# Patient Record
Sex: FEMALE | Race: BLACK OR AFRICAN AMERICAN | NOT HISPANIC OR LATINO | Employment: FULL TIME | URBAN - METROPOLITAN AREA
[De-identification: names, ages, dates, MRNs, and addresses within clinical notes are randomized per-mention and may not be internally consistent; named-entity substitution may affect disease eponyms.]

---

## 2017-04-16 ENCOUNTER — HOSPITAL ENCOUNTER (EMERGENCY)
Facility: HOSPITAL | Age: 37
Discharge: HOME | End: 2017-04-16
Attending: EMERGENCY MEDICINE
Payer: MEDICAID

## 2017-04-16 VITALS
DIASTOLIC BLOOD PRESSURE: 84 MMHG | WEIGHT: 145 LBS | BODY MASS INDEX: 28.47 KG/M2 | HEART RATE: 64 BPM | OXYGEN SATURATION: 100 % | SYSTOLIC BLOOD PRESSURE: 132 MMHG | RESPIRATION RATE: 16 BRPM | HEIGHT: 60 IN | TEMPERATURE: 98 F

## 2017-04-16 DIAGNOSIS — H92.03 EAR PAIN, BILATERAL: Primary | ICD-10-CM

## 2017-04-16 DIAGNOSIS — J34.89 RHINORRHEA: ICD-10-CM

## 2017-04-16 DIAGNOSIS — J02.9 PHARYNGITIS, UNSPECIFIED ETIOLOGY: ICD-10-CM

## 2017-04-16 LAB
B-HCG UR QL: NEGATIVE
CTP QC/QA: YES

## 2017-04-16 PROCEDURE — 99283 EMERGENCY DEPT VISIT LOW MDM: CPT

## 2017-04-16 PROCEDURE — 81025 URINE PREGNANCY TEST: CPT | Performed by: EMERGENCY MEDICINE

## 2017-04-16 PROCEDURE — 25000003 PHARM REV CODE 250: Performed by: EMERGENCY MEDICINE

## 2017-04-16 PROCEDURE — 63600175 PHARM REV CODE 636 W HCPCS: Performed by: EMERGENCY MEDICINE

## 2017-04-16 RX ORDER — DIPHENHYDRAMINE HCL 25 MG
25 CAPSULE ORAL
Status: COMPLETED | OUTPATIENT
Start: 2017-04-16 | End: 2017-04-16

## 2017-04-16 RX ORDER — PREDNISONE 20 MG/1
40 TABLET ORAL DAILY
Qty: 10 TABLET | Refills: 0 | Status: SHIPPED | OUTPATIENT
Start: 2017-04-16 | End: 2017-04-21

## 2017-04-16 RX ORDER — PREDNISONE 20 MG/1
60 TABLET ORAL
Status: COMPLETED | OUTPATIENT
Start: 2017-04-16 | End: 2017-04-16

## 2017-04-16 RX ORDER — DIPHENHYDRAMINE HCL 25 MG
25 CAPSULE ORAL EVERY 6 HOURS PRN
Qty: 20 CAPSULE | Refills: 0 | COMMUNITY
Start: 2017-04-16 | End: 2018-05-09

## 2017-04-16 RX ORDER — AMLODIPINE BESYLATE 5 MG/1
5 TABLET ORAL DAILY
COMMUNITY
End: 2018-05-09

## 2017-04-16 RX ADMIN — PREDNISONE 60 MG: 20 TABLET ORAL at 05:04

## 2017-04-16 RX ADMIN — DIPHENHYDRAMINE HYDROCHLORIDE 25 MG: 25 CAPSULE ORAL at 05:04

## 2017-04-16 NOTE — ED AVS SNAPSHOT
[facility]  [place] [address]               Félix Sanchez   2017  4:00 PM   ED    Description:  Female : 1980   Department:  [facility]           Your Care was Coordinated By:     Provider Role From To    Luis Carlos Mckee MD Attending Provider 17 [phone] --      Reason for Visit     Sore Throat           Diagnoses this Visit        Comments    Ear pain, bilateral    -  Primary     Pharyngitis, unspecified etiology         Rhinorrhea           ED Disposition     None           To Do List           Follow-up Information     Follow up with Lillian Erickson MD In 3 days.    Specialty:  Family Medicine    Contact information:    [address] [address]         These Medications        Disp Refills Start End    predniSONE (DELTASONE) 20 MG tablet 10 tablet 0 2017    Take 2 tablets (40 mg total) by mouth once daily. - Oral      PURCHASE these Medications (No prescription required)        Start End    diphenhydrAMINE (BENADRYL) 25 mg capsule 2017     Sig - Route: Take 1 each (25 mg total) by mouth every 6 (six) hours as needed (Facial congestion and itching). - Oral    Class: OTC      [facility]     [facility] Care [facility] - 24/7 Assistance  Unless otherwise directed by your provider, please contact [facility], our nurse care line that is available for 24/7 assistance.     Registered nurses in the [facility] provide: appointment scheduling, clinical advisement, health education, and other advisory services.  Call: [phone] (toll free)               Medications           Message regarding Medications     Verify the changes and/or additions to your medication regime listed below are the same as discussed with your clinician today.  If any of these changes or additions are incorrect, please notify your healthcare provider.         START taking these NEW medications        Refills    predniSONE (DELTASONE) 20 MG tablet 0    Sig: Take 2 tablets (40 mg total) by mouth once daily.    Class: Print    Route: Oral    diphenhydrAMINE (BENADRYL) 25 mg capsule 0    Sig: Take 1 each (25 mg total) by mouth every 6 (six) hours as needed (Facial congestion and itching).    Class: OTC    Route: Oral      STOP taking these medications     ibuprofen (ADVIL,MOTRIN) 800 MG tablet Take 1 tablet (800 mg total) by mouth every 6 (six) hours as needed for Pain.    naproxen (NAPROSYN) 500 MG tablet Take 1 tablet (500 mg total) by mouth 2 (two) times daily with meals.           Verify that the below list of medications is an accurate representation of the medications you are currently taking.  If none reported, the list may be blank. If incorrect, please contact your healthcare provider. Carry this list with you in case of emergency.           Current Medications     amlodipine (NORVASC) 5 MG tablet Take 5 mg by mouth once daily.    diphenhydrAMINE (BENADRYL) 25 mg capsule Take 1 each (25 mg total) by mouth every 6 (six) hours as needed (Facial congestion and itching).    predniSONE (DELTASONE) 20 MG tablet Take 2 tablets (40 mg total) by mouth once daily.           Clinical Reference Information           Your Vitals Were     BP Pulse Temp Resp Height Weight    132/62 70 98.4 °F (36.9 °C) 18 5' (1.524 m) 65.8 kg (145 lb)    SpO2 BMI             99% 28.32 kg/m2         Allergies as of 4/16/2017     No Known Allergies      Immunizations Administered on Date of Encounter - 4/16/2017     None      ED Micro, Lab, POCT     Start Ordered       Status Ordering Provider    04/16/17 1505 04/16/17 1504  POCT urine pregnancy  Once      Final result       ED Imaging Orders     None        Discharge Instructions       With us Robitussin-DM for cough.  Tylenol for pain.  Please return immediately if you get worse or if new problems develop.  Please follow-up with her primary care  doctor this week.  Rest.    [facility] Sign-Up     Activating your [facility] account is as easy as 1-2-3!     1) Visit [email or web address], select Sign Up Now, enter this activation code and your date of birth, then select Next.  [email or web address]  Expires: 5/31/2017  4:18 PM      2) Create a username and password to use when you visit [facility] in the future and select a security question in case you lose your password and select Next.    3) Enter your e-mail address and click Sign Up!    Additional Information  If you have questions, please e-mail [email or web address] or call [phone] to talk to our [facility] staff. Remember, [facility] is NOT to be used for urgent needs. For medical emergencies, dial 911.          [facility] complies with applicable Federal civil rights laws and does not discriminate on the basis of race, color, national origin, age, disability, or sex.        Language Assistance Services     ATTENTION: Language assistance services are available, free of charge. Please call [phone].      ATENCIÓN: Si habla español, tiene a garay disposición servicios gratuitos de asistencia lingüística. Llame al [phone].     CHÚ Ý: N?u b?n nói Ti?ng Vi?t, có các d?ch v? h? tr? ngôn ng? mi?n phí dành cho b?n. G?i s? [phone].

## 2017-04-16 NOTE — ED TRIAGE NOTES
"Pt c/o "sore throat for some time since mid feburary. I cough up cold." productive cough with white sputum. Also c/o right ear pain x1 week after starting to wear ear piece for work  "

## 2017-04-16 NOTE — ED PROVIDER NOTES
Encounter Date: 4/16/2017    SCRIBE #1 NOTE: I, Marcela Crane, am scribing for, and in the presence of,  Luis Carlos Mckee MD. I have scribed the following portions of the note - Other sections scribed: ROS and HPI.       History     Chief Complaint   Patient presents with    Sore Throat     States she has a sore throat, ear pain, and can't hear out of her right ear     Review of patient's allergies indicates:  No Known Allergies  HPI Comments: CC: Sore Throat  HPI: This 36 y.o. female with a past medical history of Hypertension, presents to the ED complaining of congestion, L ear tingling, R ear pain, sore throat, and productive cough with mucus for last 2 months. Symptoms are acute, mild, and intermittent. Patient denies fever, chills, headache, trouble breathing, diarrhea or any other associated sx. No reported medical intervention for her sx.    The history is provided by the patient. No  was used.     Past Medical History:   Diagnosis Date    Hypertension      Past Surgical History:   Procedure Laterality Date    UTERINE FIBROID SURGERY       History reviewed. No pertinent family history.  Social History   Substance Use Topics    Smoking status: Never Smoker    Smokeless tobacco: None    Alcohol use Yes      Comment: occasionally     Review of Systems   Constitutional: Negative for chills and fever.   HENT: Positive for ear pain (bilateral) and sore throat.    Eyes: Negative for visual disturbance.   Respiratory: Positive for cough (with mucus). Negative for shortness of breath.    Gastrointestinal: Negative for abdominal pain and diarrhea.   Neurological: Negative for headaches.       Physical Exam   Initial Vitals   BP Pulse Resp Temp SpO2   04/16/17 1447 04/16/17 1447 04/16/17 1447 04/16/17 1447 04/16/17 1447   132/62 70 18 98.4 °F (36.9 °C) 99 %     Physical Exam  The patient was examined specifically for the following:   General:No significant distress, Good color, Warm and dry. Head  and neck:Scalp atraumatic, Neck supple. Neurological:Appropriate conversation, Gross motor deficits. Eyes:Conjugate gaze, Clear corneas. ENT: No epistaxis. Cardiac: Regular rate and rhythm, Grossly normal heart tones. Pulmonary: Wheezing, Rales. Gastrointestinal: Abdominal tenderness, Abdominal distention. Musculoskeletal: Extremity deformity, Apparent pain with range of motion of the joints. Skin: Rash.   The findings on examination were normal except for the following: Tympanic membranes are clear.  The ear canals are normal.  There is cerumen in both ear canals.  The pharynx is slightly red.  I no exudates.  There is no adenopathy.  There is no facial tenderness.  The patient has no lymphadenopathy.  She did not cough during the physical examination.  ED Course   Procedures  Labs Reviewed   POCT URINE PREGNANCY              Medical decision making: Given the above this patient has failed facial congestion bilateral ear pain and sore throat cough.  She's been sick for 2 months.  I find no convincing evidence of bacterial disease.  There is cerumen in both ear canals I doubt otitis media and otitis externa.  I will try to this patient with prednisone.  I will have her use Tylenol for pain.  I will advise Benadryl and Robitussin-DM.  I will have her return if she gets worse or if new problems develop.  I specifically doubt bacterial disease.  I doubt strep pharyngitis.  The patient is afebrile.            Scribe Attestation:   Scribe #1: I performed the above scribed service and the documentation accurately describes the services I performed. I attest to the accuracy of the note.    Attending Attestation:           Physician Attestation for Scribe:  Physician Attestation Statement for Scribe #1: I, Luis Carlos Mckee MD, reviewed documentation, as scribed by Marcela Crane in my presence, and it is both accurate and complete.                 ED Course     Clinical Impression:   The primary encounter diagnosis was Ear pain,  bilateral. Diagnoses of Pharyngitis, unspecified etiology and Rhinorrhea were also pertinent to this visit.          Luis Carlos Mckee MD  04/17/17 2128

## 2017-04-16 NOTE — DISCHARGE INSTRUCTIONS
With us Robitussin-DM for cough.  Tylenol for pain.  Please return immediately if you get worse or if new problems develop.  Please follow-up with her primary care doctor this week.  Rest.

## 2017-04-21 ENCOUNTER — HOSPITAL ENCOUNTER (EMERGENCY)
Facility: HOSPITAL | Age: 37
Discharge: HOME | End: 2017-04-21
Attending: EMERGENCY MEDICINE
Payer: MEDICAID

## 2017-04-21 VITALS
HEART RATE: 77 BPM | BODY MASS INDEX: 21.98 KG/M2 | WEIGHT: 145 LBS | OXYGEN SATURATION: 100 % | HEIGHT: 68 IN | DIASTOLIC BLOOD PRESSURE: 85 MMHG | RESPIRATION RATE: 20 BRPM | TEMPERATURE: 98 F | SYSTOLIC BLOOD PRESSURE: 130 MMHG

## 2017-04-21 DIAGNOSIS — H92.03 OTALGIA OF BOTH EARS: Primary | ICD-10-CM

## 2017-04-21 DIAGNOSIS — R09.81 NASAL CONGESTION: ICD-10-CM

## 2017-04-21 PROCEDURE — 99283 EMERGENCY DEPT VISIT LOW MDM: CPT

## 2017-04-21 RX ORDER — DIPHENHYDRAMINE HCL 25 MG
25 CAPSULE ORAL EVERY 6 HOURS PRN
Qty: 20 CAPSULE | Refills: 0 | Status: SHIPPED | OUTPATIENT
Start: 2017-04-21 | End: 2018-05-09

## 2017-04-21 RX ORDER — DIPHENHYDRAMINE HCL 25 MG
25 CAPSULE ORAL EVERY 6 HOURS PRN
Qty: 20 CAPSULE | Refills: 0 | COMMUNITY
Start: 2017-04-21 | End: 2017-04-21

## 2017-04-21 RX ORDER — FLUTICASONE PROPIONATE 50 MCG
1 SPRAY, SUSPENSION (ML) NASAL 2 TIMES DAILY PRN
Qty: 15 G | Refills: 0 | Status: SHIPPED | OUTPATIENT
Start: 2017-04-21 | End: 2019-03-27

## 2017-04-21 NOTE — DISCHARGE INSTRUCTIONS

## 2017-04-21 NOTE — ED AVS SNAPSHOT
[facility]  [place] [address]               Félix Sanchez   2017  2:21 PM   ED    Description:  Female : 1980   Department:  [facility]           Your Care was Coordinated By:     Provider Role From To    Ambrose Simeon MD Attending Provider 17 [phone] --    Theodore Landis PA-C Physician Assistant 17 [phone] --      Reason for Visit     Otalgia           Diagnoses this Visit        Comments    Otalgia of both ears    -  Primary     Nasal congestion           ED Disposition     None           To Do List           Follow-up Information     Follow up with [facility]. Schedule an appointment as soon as possible for a visit in 1 day.    Specialty:  Otolaryngology    Why:  For further evaluation    Contact information:    [address] [facility] [address]  [place] [address]  [phone]          Go to [facility].    Specialty:  Emergency Medicine    Why:  If symptoms worsen    Contact information:    [place] [address] [phone]       These Medications        Disp Refills Start End    fluticasone (FLONASE) 50 mcg/actuation nasal spray 15 g 0 2017     1 spray by Each Nare route 2 (two) times daily as needed for Rhinitis. - Each Nare    loratadine-pseudoephedrine  mg (CLARITIN-D 24 HOUR)  mg per 24 hr tablet 10 tablet 0 2017    Take 1 tablet by mouth once daily. Take during the day - Oral      PURCHASE these Medications (No prescription required)        Start End    diphenhydrAMINE (BENADRYL) 25 mg capsule 2017     Sig - Route: Take 1 each (25 mg total) by mouth every 6 (six) hours as needed for Itching or Allergies. Take at night - Oral    Class: OTC      [facility]     [facility] -  Assistance  Unless otherwise directed by your provider, please contact [facility], our nurse care  line that is available for  assistance.     Registered nurses in the [facility] provide: appointment scheduling, clinical advisement, health education, and other advisory services.  Call: [phone] (toll free)               Medications           Message regarding Medications     Verify the changes and/or additions to your medication regime listed below are the same as discussed with your clinician today.  If any of these changes or additions are incorrect, please notify your healthcare provider.        START taking these NEW medications        Refills    fluticasone (FLONASE) 50 mcg/actuation nasal spray 0    Si spray by Each Nare route 2 (two) times daily as needed for Rhinitis.    Class: Print    Route: Each Nare    loratadine-pseudoephedrine  mg (CLARITIN-D 24 HOUR)  mg per 24 hr tablet 0    Sig: Take 1 tablet by mouth once daily. Take during the day    Class: Print    Route: Oral    diphenhydrAMINE (BENADRYL) 25 mg capsule 0    Sig: Take 1 each (25 mg total) by mouth every 6 (six) hours as needed for Itching or Allergies. Take at night    Class: OTC    Route: Oral           Verify that the below list of medications is an accurate representation of the medications you are currently taking.  If none reported, the list may be blank. If incorrect, please contact your healthcare provider. Carry this list with you in case of emergency.           Current Medications     amlodipine (NORVASC) 5 MG tablet Take 5 mg by mouth once daily.    diphenhydrAMINE (BENADRYL) 25 mg capsule Take 1 each (25 mg total) by mouth every 6 (six) hours as needed (Facial congestion and itching).    diphenhydrAMINE (BENADRYL) 25 mg capsule Take 1 each (25 mg total) by mouth every 6 (six) hours as needed for Itching or Allergies. Take at night    fluticasone (FLONASE) 50 mcg/actuation nasal spray 1 spray by Each Nare route 2 (two) times daily as needed for Rhinitis.    loratadine-pseudoephedrine  mg  "(CLARITIN-D 24 HOUR)  mg per 24 hr tablet Take 1 tablet by mouth once daily. Take during the day    predniSONE (DELTASONE) 20 MG tablet Take 2 tablets (40 mg total) by mouth once daily.           Clinical Reference Information           Your Vitals Were     BP Pulse Temp Resp Height Weight    125/73 (BP Location: Left arm, Patient Position: Sitting) 78 98.5 °F (36.9 °C) (Oral) 16 5' 8" (1.727 m) 65.8 kg (145 lb)    Last Period SpO2 BMI          04/14/2017 100% 22.05 kg/m2        Allergies as of 4/21/2017     No Known Allergies      Immunizations Administered on Date of Encounter - 4/21/2017     None      ED Micro, Lab, POCT     None      ED Imaging Orders     None        Discharge Instructions         Earache, No Infection (Adult)  Earaches can happen without an infection. This occurs when air and fluid build up behind the eardrum causing a feeling of fullness and discomfort and reduced hearing. This is called otitis media with effusion (OME) or serous otitis media. It means there is fluid in the middle ear. It is not the same as acute otitis media, which is typically from infection.  OME can happen when you have a cold if congestion blocks the passage that drains the middle ear. This passage is called the eustachian tube. OME may also occur with nasal allergies or after a bacterial middle ear infection.    The pain or discomfort may come and go. You may hear clicking or popping sounds when you chew or swallow. You may feel that your balance is off. Or you may hear ringing in the ear.  It often takes from several weeks up to 3 months for the fluid to clear on its own. Oral pain relievers and ear drops help if there is pain. Decongestants and antihistamines sometimes help. Antibiotics don't help since there is no infection. Your doctor may prescribe a nasal spray to help reduce swelling in the nose and eustachian tube. This can allow the ear to drain.  If your OME doesn't improve after 3 months, surgery may be " used to drain the fluid and insert a small tube in the eardrum to allow continued drainage.  Because the middle ear fluid can become infected, it is important to watch for signs of an ear infection which may develop later. These signs include increased ear pain, fever, or drainage from the ear.  Home care  The following guidelines will help you care for yourself at home:  · You may use over-the-counter medicine as directed to control pain, unless another medicine was prescribed. If you have chronic liver or kidney disease or ever had a stomach ulcer or GI bleeding, talk with your doctor before using these medicines. Aspirin should never be used in anyone under 18 years of age who is ill with a fever. It may cause severe liver damage.  · You may use over-the-counter decongestants such as phenylephrine or pseudoephedrine. But they are not always helpful. Don't use nasal spray decongestants more than 3 days. Longer use can make congestion worse. Prescription nasal sprays from your doctor don't typically have those restrictions.  · Antihistamines may help if you are also having allergy symptoms.  · You may use medicines such as guaifenesin to thin mucus and promote drainage.  Follow-up care  Follow up with your healthcare provider or as advised if you are not feeling better after 3 days.  When to seek medical advice  Call your healthcare provider right away if any of the following occur:  · Your ear pain gets worse or does not start to improve   · Fever of 100.4°F (38°C) or higher, or as directed by your healthcare provider  · Fluid or blood draining from the ear  · Headache or sinus pain  · Stiff neck  · Unusual drowsiness or confusion  Date Last Reviewed: 10/1/2016  © 2093-9487 [employer]. [address] [place] [address]. All rights reserved. This information is not intended as a substitute for professional medical care. Always follow your healthcare professional's  instructions.          [facility] Sign-Up     Activating your [facility] account is as easy as 1-2-3!     1) Visit [email or web address], select Sign Up Now, enter this activation code and your date of birth, then select Next.  [email or web address]  Expires: 5/31/2017  4:18 PM      2) Create a username and password to use when you visit [facility] in the future and select a security question in case you lose your password and select Next.    3) Enter your e-mail address and click Sign Up!    Additional Information  If you have questions, please e-mail [email or web address] or call [phone] to talk to our [facility] staff. Remember, [facility] is NOT to be used for urgent needs. For medical emergencies, dial 911.          [facility] complies with applicable Federal civil rights laws and does not discriminate on the basis of race, color, national origin, age, disability, or sex.        Language Assistance Services     ATTENTION: Language assistance services are available, free of charge. Please call [phone].      ATENCIÓN: Si habla español, tiene a garay disposición servicios gratuitos de asistencia lingüística. Llame al [phone].     CHÚ Ý: N?u b?n nói Ti?ng Vi?t, có các d?ch v? h? tr? ngôn ng? mi?n phí dành cho b?n. G?i s? [phone].

## 2017-04-21 NOTE — ED TRIAGE NOTES
Pain that started in right ear and progressed to other ear seen her 5 days ago nose and throat problem in February  Has heard popping in ear

## 2017-04-21 NOTE — ED PROVIDER NOTES
"Encounter Date: 4/21/2017    SCRIBE #1 NOTE: I, Rocky Soto, am scribing for, and in the presence of,  Theodore Landis PA-C. I have scribed the following portions of the note - Other sections scribed: HPI and ROS.       History     Chief Complaint   Patient presents with    Otalgia     " I came here on easter because I was having ear, nose and throat pain. I am on my 5th day of steriods but I am still having pain."      Review of patient's allergies indicates:  No Known Allergies  HPI Comments: CC: Otalgia    HPI: This 36 y.o F with HTN presents to the ED c/o acute onset of constant and moderate (6/10) bilateral otalgia with described as "pressure and popping" with associated "fullness" of ears x2 weeks. She also reports rhinorrhea, intermittent frontal headache (none currently), and interment dizziness with activity described as "room spinning" (none currently). Pt was last seen in this ED 4/16/17 with the same symptoms; denies new or acute symptoms since 4/16. The pt also reports constant nasal congestion which began in February.The pt notes this pain is similar to her ear infections in the past. The pt denies fever, cough, sore throat, headache, chest pain, emesis and SOB. Pt attempted tx with Tylenol, Prednisone and Afrin with no relief; no medicine taken today.     The history is provided by the patient. No  was used.     Past Medical History:   Diagnosis Date    Hypertension      Past Surgical History:   Procedure Laterality Date    UTERINE FIBROID SURGERY       History reviewed. No pertinent family history.  Social History   Substance Use Topics    Smoking status: Never Smoker    Smokeless tobacco: None    Alcohol use Yes      Comment: occasionally     Review of Systems   Constitutional: Negative for fever.   HENT: Positive for ear pain (bilateral "pressure") and rhinorrhea. Negative for sore throat and tinnitus.         (+) "fullness" to ears   Respiratory: Negative for cough and " "shortness of breath.    Cardiovascular: Negative for chest pain.   Gastrointestinal: Negative for abdominal pain, nausea and vomiting.   Genitourinary: Negative for dysuria.   Musculoskeletal: Negative for back pain.   Skin: Negative for rash.   Neurological: Positive for dizziness ("room spinning"; resolved). Negative for weakness and headaches.   Hematological: Does not bruise/bleed easily.       Physical Exam   Initial Vitals   BP Pulse Resp Temp SpO2   04/21/17 1326 04/21/17 1326 04/21/17 1326 04/21/17 1326 04/21/17 1326   125/73 78 16 98.5 °F (36.9 °C) 100 %     Physical Exam    Nursing note and vitals reviewed.  Constitutional: She appears well-developed and well-nourished. She is not diaphoretic. No distress.   HENT:   Head: Normocephalic and atraumatic.   Right Ear: Tympanic membrane, external ear and ear canal normal. No drainage. No mastoid tenderness. Tympanic membrane is not perforated and not erythematous.   Left Ear: External ear and ear canal normal. No drainage. No mastoid tenderness. Tympanic membrane is not perforated and not erythematous.   Nose: Nose normal. No rhinorrhea. Right sinus exhibits no maxillary sinus tenderness and no frontal sinus tenderness. Left sinus exhibits no maxillary sinus tenderness and no frontal sinus tenderness.   Mouth/Throat: Uvula is midline and oropharynx is clear and moist. No oral lesions. No uvula swelling. No oropharyngeal exudate, posterior oropharyngeal edema, posterior oropharyngeal erythema or tonsillar abscesses.   Non-purulent effusion to L TM; able to discern several bony structures behind TM. Nasal congestion present. Speaking in clear and complete sentences. No TMJ TTP or pain with movement of jaw. No dental abscess or PTA.    Eyes: Conjunctivae and EOM are normal. Right eye exhibits no discharge. Left eye exhibits no discharge.   Neck: Normal range of motion. No tracheal deviation present. No rigidity. No JVD present.   Cardiovascular: Normal rate, " regular rhythm and normal heart sounds. Exam reveals no friction rub.    No murmur heard.  Pulmonary/Chest: Breath sounds normal. No stridor. No respiratory distress. She has no decreased breath sounds. She has no wheezes. She has no rhonchi. She has no rales. She exhibits no tenderness.   Musculoskeletal: Normal range of motion.   Neurological: She is alert and oriented to person, place, and time.   Skin: Skin is warm and dry. No rash and no abscess noted. No erythema. No pallor.         ED Course   Procedures  Labs Reviewed   POCT URINE PREGNANCY             Medical Decision Making:   History:   Old Medical Records: I decided to obtain old medical records.    This is an emergent evaluation of a 36 y.o. female with a PMHx of HTN presenting to the ED for persistent otalgia associated with nasal congeston. Denies fever, HA, emesis, dental pain, neck pain, and tinnitus. Vitals WNL, afebrile. Patient is non-toxic appearing and in no acute distress. Otalgia likely related to nasal congestion from resolving URI. No AOM, otitis externa, mastoiditis, or meningitis. No evidence of acute bacterial sinus infection; I do not feel patient requires antibiotics at this time. I doubt TMJ syndrome, trigeminal neuralgia, dental abscess, PTA, strep throat, and Joey's.     Patient recently completed steroid burst. Discharged home with flonase, claritin D, and benadryl. Instructed to follow up with ENT for reevaluation and management of symptoms. Patient is requesting a work note dating back 5 days and for tomorrow off. I will issue a work note for tomorrow off only.     I discussed with the patient the diagnosis, treatment plan, indications for return to the emergency department, and for expected follow-up. The patient verbalized an understanding. The patient is asked if there are any questions or concerns. We discuss the case, until all issues are addressed to the patients satisfaction. Patient understands and is agreeable to the  plan.     I discussed this patient with Dr. Simeon who is in agreement with my assessment and plan.           Scribe Attestation:   Scribe #1: I performed the above scribed service and the documentation accurately describes the services I performed. I attest to the accuracy of the note.    Attending Attestation:     Physician Attestation Statement for NP/PA:   I discussed this assessment and plan of this patient with the NP/PA, but I did not personally examine the patient. The face to face encounter was performed by the NP/PA.    Other NP/PA Attestation Additions:      Medical Decision Makin-year-old female presenting with bilateral ear pain and to pain.  I agree with plan.       Physician Attestation for Scribe:  Physician Attestation Statement for Scribe #1: I, Theodore Landis PA-C, reviewed documentation, as scribed by Rocky Soto in my presence, and it is both accurate and complete.                 ED Course     Clinical Impression:   The primary encounter diagnosis was Otalgia of both ears. A diagnosis of Nasal congestion was also pertinent to this visit.    Disposition:   Disposition: Discharged  Condition: Stable       Theodore Landis PA-C  17 [id number]       Ambrose Simeon MD  [id number]

## 2018-04-20 DIAGNOSIS — O10.919 CHRONIC HYPERTENSION AFFECTING PREGNANCY: ICD-10-CM

## 2018-04-20 DIAGNOSIS — Z36.89 ENCOUNTER FOR FETAL ANATOMIC SURVEY: Primary | ICD-10-CM

## 2018-04-20 DIAGNOSIS — O09.522 ELDERLY MULTIGRAVIDA IN SECOND TRIMESTER: ICD-10-CM

## 2018-04-24 ENCOUNTER — OFFICE VISIT (OUTPATIENT)
Dept: MATERNAL FETAL MEDICINE | Facility: CLINIC | Age: 38
End: 2018-04-24
Payer: COMMERCIAL

## 2018-04-24 VITALS
DIASTOLIC BLOOD PRESSURE: 82 MMHG | BODY MASS INDEX: 26.21 KG/M2 | WEIGHT: 172.38 LBS | SYSTOLIC BLOOD PRESSURE: 124 MMHG

## 2018-04-24 DIAGNOSIS — O28.0 ABNORMAL QUAD SCREEN: ICD-10-CM

## 2018-04-24 DIAGNOSIS — O10.919 CHRONIC HYPERTENSION AFFECTING PREGNANCY: ICD-10-CM

## 2018-04-24 DIAGNOSIS — O09.522 ELDERLY MULTIGRAVIDA IN SECOND TRIMESTER: ICD-10-CM

## 2018-04-24 DIAGNOSIS — Z36.89 ENCOUNTER FOR FETAL ANATOMIC SURVEY: ICD-10-CM

## 2018-04-24 DIAGNOSIS — O09.522 AMA (ADVANCED MATERNAL AGE) MULTIGRAVIDA 35+, SECOND TRIMESTER: ICD-10-CM

## 2018-04-24 PROCEDURE — 99215 OFFICE O/P EST HI 40 MIN: CPT | Mod: 25,S$GLB,, | Performed by: PEDIATRICS

## 2018-04-24 PROCEDURE — 99999 PR PBB SHADOW E&M-EST. PATIENT-LVL III: CPT | Mod: PBBFAC,,, | Performed by: PEDIATRICS

## 2018-04-24 PROCEDURE — 76811 OB US DETAILED SNGL FETUS: CPT | Mod: S$GLB,,, | Performed by: PEDIATRICS

## 2018-04-24 RX ORDER — NAPROXEN SODIUM 220 MG/1
81 TABLET, FILM COATED ORAL DAILY
Status: ON HOLD | COMMUNITY
End: 2018-08-14 | Stop reason: HOSPADM

## 2018-04-24 NOTE — LETTER
April 26, 2018      Luiz Ernandez MD  [address]  [address]  [place] [address]           [facility] - Maternal Fetal Med  [address] [address]  Phone: [phone]          Patient: Félix Sanchez   MR Number: [id number]   YOB: 1980   Date of Visit: 4/24/2018       Dear Dr. Luiz Ernandez:    Thank you for referring Félix Sanchez to me for evaluation. Attached you will find relevant portions of my assessment and plan of care.    If you have questions, please do not hesitate to call me. I look forward to following Félix Sanchez along with you.    Sincerely,    Nikki Thibodeaux MD    Enclosure  CC:  No Recipients    If you would like to receive this communication electronically, please contact [email or web address] or [phone] to request more information on [employer] Link access.    For providers and/or their staff who would like to refer a patient to [facility], please contact us through our one-stop-shop provider referral line, [facility], at [phone].    If you feel you have received this communication in error or would no longer like to receive these types of communications, please e-mail [email or web address]

## 2018-04-24 NOTE — PROGRESS NOTES
I saw Ms. Félix Davenport today for consultation regarding a screen positive quad followed by a screen negative cfDNA/NIPT.   She is a 37 you  female at Today I counseled the patient on the relationship between maternal age and genetic aneuploidy and positive/negative screening.  We discussed the risks and benefits of screening tests versus definitive genetic testing (amniocentesis). We discussed the limitations of ultrasound in the definitive diagnosis of Down syndrome and we discussed amniocentesis as providing definitive diagnosis.  I quoted the patient a 1 in 1200 procedure related risk of fetal loss with genetic amniocentesis. We discussed maternal serum screening with a positive increased DSr (hers was 1:38).   Her follow up non-invasive prenatal testing (NIPT) was screen negative; we discussed the accuracy/definitiveness including the sensitivity and specificity of the test.  While she knows that US is not diagnostic of aneuploidy or normalcy, a negative ultrasound reduces the risk of DS by 50+ %.    Her questions were answered, and after today's consultation she did not want to pursue amniocentesis.          In addition, I counseled the patient on maternal/fetal risks associated with CHTN during pregnancy including fetal growth restriction, miscarriage,  delivery, development of superimposed preeclampsia, and eclampsia. She was counseled on the recommendations for BP control, serial ultrasound for fetal growth assessment, and timing of delivery. I also recommend aspirin 81 mg daily which may decrease her risk of developing superimposed preeclampsia.              Recommendations:    1. Increased risk of hypertensive disease and preeclampsia,  birth, placenta previa, gestational diabetes, and placental abruption with diabetes require education.    2.  The patient's risk for adverse outcome should be assessed by age and  presence or absence of concomitant risk factors such as hypertension,  diabetes, obesity, low socioeconomic status, black race, and previous pregnancy complicated by growth restriction or  birth.    3. Genetic screening (done - see consult).    4. Anatomy scan (in process).    5. If between 35 and 39 inclusive, we recommend a follow up fetal ultrasound at 32-34 weeks for interval fetal growth and well being (biophysical profile).    6. Because of the increased risk of preeclampsia in patients with underlying chronic hypertension we recommend starting aspirin 81mg daily beginning in the late first trimester (taking).    7. Because of the increased risk of preeclampsia in patients with chronic hypertension we recommend obtaining a baseline CMP and 24 hour urine protein evaluation or a baseline urine protein to creatinine ratio.    8. Secondary to the higher incidence of intrauterine growth restriction (IUGR) in patients with chronic hypertension, we recommend periodic (every 4-6 weeks) fetal growth assessments.     9. Continued close observation of patient's blood pressures.  Care should be taken also to avoid hypotension in pregnancy as this can be associated with uteroplacental insufficiency.      10. Twice weekly fetal NSTs with once weekly MVP starting at 32 weeks until delivery.    11. In regards to timing of delivery we recommend following the guidelines from ACOG Committee Opinion Number 560 from 2013 as follows:    -Women on no medications: at 38 0/7 - 39 6/7 weeks EGA  -Women that are well controlled on medications: at 37 0/7 - 39 6/7 weeks EGA            Indication  ========    Fetal anatomy survey.    Method  ======    Transabdominal ultrasound examination, Voluson E10. View: Suboptimal view: limited by fetal position. Suboptimal view: limited by maternal  body habitus.    Pregnancy  =========    Bella pregnancy. Number of fetuses: 1.    Dating  ======    Ultrasound examination on: 2018  GA by U/S based upon: AC, BPD, Femur, HC  GA by U/S 21 w + 6  d  PARMJIT by U/S: 8/29/2018    General Evaluation  ==============    Cardiac activity: present.  bpm.  Fetal movements: visualized.  Presentation: breech.  Placenta:  Placental site: fundal.  Umbilical cord: Cord vessels: 3 vessel cord. Cord insertion: placental insertion: normal.  Amniotic fluid: Amount of AF: normal amount.    Fetal Biometry  ============    Fetal Biometry  BPD 49.9 mm 21w 1d Hadlock  OFD 71.0 mm 23w 5d Sagar  .3 mm 21w 6d Hadlock  .4 mm 21w 6d Hadlock  Femur 38.6 mm 22w 3d Hadlock  Cerebellum tr 23.8 mm 22w 6d Jaquez  CM 5.6 mm  Humerus 36.9 mm 22w 6d Sagar   g  Calculated by: Hadlock (BPD-HC-AC-FL)  EFW (lb) 1 lb  EFW (oz) 1 oz  Cephalic index 0.70  HC / AC 1.17  FL / BPD 0.77  FL / AC 0.23   bpm  Head / Face / Neck   5.8 mm    Fetal Anatomy  ============    Cranium: normal  Lateral ventricles: normal  Choroid plexus: normal  Midline falx: normal  Cavum septi pellucidi: normal  Cerebellum: normal  Cisterna magna: normal  Head shape: normal  Rt lateral ventricle: normal  Lt lateral ventricle: normal  Rt choroid plexus: normal  Lt choroid plexus: normal  Neck: suboptimal  Nuchal fold: not examined  Lips: suboptimal  Profile: normal  Nose: suboptimal  RVOT: suboptimal  LVOT: normal  4-chamber view: 4-chamber normal, septum suboptimal  Situs: normal  Aortic arch: suboptimal  Ductal arch: suboptimal  SVC: suboptimal  IVC: suboptimal  3-vessel view: suboptimal  3-vessel-trachea view: suboptimal  Cardiac axis: normal  Cardiac size: normal  Cardiac proportions: normal  Rt lung: normal  Lt lung: normal  Diaphragm: suboptimal  Cord insertion: normal  Stomach: normal  Kidneys: normal  Bladder: normal  Genitals: normal  Abdom. wall: normal  Rt kidney: normal  Lt kidney: normal  Liver: normal  Bowel: normal  Rt renal artery: normal  Lt renal artery: normal  Cervical spine: normal  Thoracic spine: normal  Lumbar spine: normal  Sacral spine: suboptimal  Rt arm: normal  Lt  arm: normal  Rt hand: suboptimal  Lt hand: visualized  Rt leg: normal  Lt leg: normal  Rt foot: visualized  Lt foot: visualized  Position of feet: normal    Maternal Structures  ===============    Uterus / Cervix  Cervical length 49.4 mm  Ovaries / Tubes / Adnexa  Rt ovary: Not visualized  Lt ovary: Not visualized            Consultation  ==========    See EPIC.            Impression  =========    A detailed fetal anatomic ultrasound examination was performed for the following high risk indication: AMA, screen positve quad, screen  negative cfDNA.    No fetal structural malformations are identified; however, fetal imaging is incomplete today. A follow-up study will be scheduled to complete the  fetal anatomic survey.    Fetal size today is consistent with established gestational age.  Cervical length is normal.  Placental location is normal without evidence of previa.        I spent 40+ minutes in direct consultation and care management with greater than 50% in face to face discussion.          Recommendation  ==============    Return in 4 weeks for completion of anatomy.  Consider fetal echo if abnormality or incomplete at next visit.    Thank you again for allowing us to participate in the care of your patients. If you have any questions concerning today's consultation, feel free  to contact me or one of my partners. We can be reached at [phone] during normal business hours. If you have a question after normal  business hours, please contact Labor and Delivery at [phone].

## 2018-04-26 PROBLEM — O28.0 ABNORMAL QUAD SCREEN: Status: ACTIVE | Noted: 2018-04-26

## 2018-04-26 PROBLEM — O10.919 CHRONIC HYPERTENSION AFFECTING PREGNANCY: Status: ACTIVE | Noted: 2018-04-26

## 2018-04-26 PROBLEM — O09.522 AMA (ADVANCED MATERNAL AGE) MULTIGRAVIDA 35+, SECOND TRIMESTER: Status: ACTIVE | Noted: 2018-04-26

## 2018-05-09 ENCOUNTER — OFFICE VISIT (OUTPATIENT)
Dept: MATERNAL FETAL MEDICINE | Facility: CLINIC | Age: 38
End: 2018-05-09
Payer: COMMERCIAL

## 2018-05-09 VITALS
BODY MASS INDEX: 26.75 KG/M2 | DIASTOLIC BLOOD PRESSURE: 74 MMHG | WEIGHT: 175.94 LBS | SYSTOLIC BLOOD PRESSURE: 122 MMHG

## 2018-05-09 DIAGNOSIS — O10.919 CHRONIC HYPERTENSION AFFECTING PREGNANCY: ICD-10-CM

## 2018-05-09 DIAGNOSIS — O09.522 ELDERLY MULTIGRAVIDA IN SECOND TRIMESTER: ICD-10-CM

## 2018-05-09 PROCEDURE — 99499 UNLISTED E&M SERVICE: CPT | Mod: S$GLB,,, | Performed by: PEDIATRICS

## 2018-05-09 PROCEDURE — 76816 OB US FOLLOW-UP PER FETUS: CPT | Mod: S$GLB,,, | Performed by: PEDIATRICS

## 2018-05-09 PROCEDURE — 99999 PR PBB SHADOW E&M-EST. PATIENT-LVL II: CPT | Mod: PBBFAC,,, | Performed by: PEDIATRICS

## 2018-05-09 NOTE — LETTER
May 10, 2018      Luiz Ernandez MD  [address]  [address]  [place] [address]           [facility] - Maternal Fetal Med  [address] [address]  Phone: [phone]          Patient: Félix Sanchez   MR Number: [id number]   YOB: 1980   Date of Visit: 5/9/2018       Dear Dr. Luiz Ernandez:    Thank you for referring Félix Sanchez to me for evaluation. Attached you will find relevant portions of my assessment and plan of care.    If you have questions, please do not hesitate to call me. I look forward to following Félix Sanchez along with you.    Sincerely,    Nikki Thibodeaux MD    Enclosure  CC:  No Recipients    If you would like to receive this communication electronically, please contact [email or web address] or [phone] to request more information on [employer] Link access.    For providers and/or their staff who would like to refer a patient to [facility], please contact us through our one-stop-shop provider referral line, [facility], at [phone].    If you feel you have received this communication in error or would no longer like to receive these types of communications, please e-mail [email or web address]

## 2018-05-10 NOTE — PROGRESS NOTES
Indication  ========    Follow-up evaluation of anatomy.    Pregnancy History  ==============    Maternal Lab Tests  Test: quad screen  Result:  + DSR    Result:  INFORMA NEG    Maternal Assessment  =================    Weight 80 kg  Weight (lb) 176 lb  BP syst 122 mmHg  BP diast 74 mmHg    Method  ======    Transabdominal ultrasound examination.    Pregnancy  =========    Bella pregnancy. Number of fetuses: 1.    Dating  ======    Cycle: regular cycle    General Evaluation  ==============    Cardiac activity: present.  bpm.  Fetal movements: visualized.  Presentation: cephalic.  Placenta: posterior.  Umbilical cord: 3 vessel cord.  Amniotic fluid: normal amount.    Fetal Biometry  ============    Fetal Biometry  Calculated by: Hadlock (BPD-HC-AC-FL)   bpm    Fetal Anatomy  ============    Lateral ventricles: normal  Lips: normal  Nose: normal  RVOT: normal  LVOT: normal  4-chamber view: 4-chamber normal, septum normal  Aortic arch: normal  Ductal arch: normal  SVC: suboptimal  IVC: suboptimal  3-vessel view: normal  3-vessel-trachea view: normal  Diaphragm: normal  Stomach: normal  Kidneys: normal  Bladder: normal  Sacral spine: visualized  Rt hand: visualized  Other: A full anatomic survey has been previously performed.          Impression  =========    The fetal anatomic survey was completed today, and no fetal structural abnormalities were noted.    The AFV is normal.          Recommendation  ==============    See prior consultation for recommendations.  Growth in 2 weeks.      Thank you again for allowing us to participate in the care of your patients. If you have any questions concerning today's consultation, feel free  to contact me or one of my partners. We can be reached at [phone] during normal business hours. If you have a question after normal  business hours, please contact Labor and Delivery at [phone].

## 2018-05-29 ENCOUNTER — HOSPITAL ENCOUNTER (EMERGENCY)
Facility: HOSPITAL | Age: 38
Discharge: HOME | End: 2018-05-29
Attending: EMERGENCY MEDICINE
Payer: COMMERCIAL

## 2018-05-29 VITALS
HEART RATE: 100 BPM | HEIGHT: 60 IN | SYSTOLIC BLOOD PRESSURE: 117 MMHG | TEMPERATURE: 99 F | BODY MASS INDEX: 34.55 KG/M2 | RESPIRATION RATE: 18 BRPM | OXYGEN SATURATION: 99 % | DIASTOLIC BLOOD PRESSURE: 70 MMHG | WEIGHT: 176 LBS

## 2018-05-29 DIAGNOSIS — M25.531 RIGHT WRIST PAIN: Primary | ICD-10-CM

## 2018-05-29 PROCEDURE — 99282 EMERGENCY DEPT VISIT SF MDM: CPT

## 2018-05-29 NOTE — ED PROVIDER NOTES
"Encounter Date: 5/29/2018  This is a SORT/MSE of a 37 y.o. female presenting to the ED with c/o right wrist pain x3 weeks. She is 27 weeks pregnant. She saw her OB/GYN today who referred her to the ED for her wrist pain. Care will be transferred to an alternate provider when patient is roomed for a full evaluation and final disposition. Patient is aware that he/she is awaiting a room in the emergency department, where another provider will review results, evaluate and treat as needed. JOY Ren DNP     History   No chief complaint on file.    38yo F, approx 27 weeks gravid with chief complaint atraumatic R wrist pain. She denies hx previous surgeries or issues. Denies radiculopathy or paresthesia. Admits to "clicking" sensation to R radial wrist. Denies swelling, denies redness or warmth, denies open wound or abrasion. Denies hx carpal tunnel or cubital tunnel. Pain constant, no alleviating or exacerbating factors, no radiation of pain.           Review of patient's allergies indicates:  No Known Allergies  Past Medical History:   Diagnosis Date    Advanced maternal age in multigravida, second trimester     Hypertension      Past Surgical History:   Procedure Laterality Date    UTERINE FIBROID SURGERY      WISDOM TOOTH EXTRACTION       No family history on file.  Social History   Substance Use Topics    Smoking status: Never Smoker    Smokeless tobacco: Never Used    Alcohol use No      Comment: occasionally     Review of Systems   Constitutional: Negative for fever.   HENT: Negative for sore throat.    Eyes: Negative.    Respiratory: Negative for shortness of breath.    Cardiovascular: Negative for chest pain.   Gastrointestinal: Negative for nausea.   Endocrine: Negative.    Genitourinary: Negative for dysuria.   Musculoskeletal: Positive for arthralgias. Negative for back pain, neck pain and neck stiffness.   Skin: Negative for rash.   Neurological: Negative for weakness and headaches.   Hematological: " Does not bruise/bleed easily.   Psychiatric/Behavioral: Negative.    All other systems reviewed and are negative.      Physical Exam     Initial Vitals   BP Pulse Resp Temp SpO2   -- -- -- -- --      MAP       --         Physical Exam    Nursing note and vitals reviewed.  Constitutional: She appears well-developed and well-nourished. She is not diaphoretic. No distress.   HENT:   Head: Normocephalic and atraumatic.   Eyes: Conjunctivae and EOM are normal. Pupils are equal, round, and reactive to light.   Neck: Normal range of motion. Neck supple. No tracheal deviation present.   Cardiovascular: Intact distal pulses.   Pulmonary/Chest: Breath sounds normal. No stridor. No respiratory distress. She has no wheezes.   Abdominal: Soft. Bowel sounds are normal. She exhibits no distension. There is no tenderness.   Musculoskeletal: Normal range of motion.   Mild ttp R distal radius, near styloid process laterally. No snuffbox ttp. Pain with wrist inversion. No palpable crepitus. No swelling or overlying skin changes. 2+radial pulse bilaterally.   Lymphadenopathy:     She has no cervical adenopathy.   Neurological: She is alert and oriented to person, place, and time.   Skin: Skin is warm and dry. Capillary refill takes less than 2 seconds.   Psychiatric: She has a normal mood and affect. Her behavior is normal. Judgment and thought content normal.         ED Course   Procedures  Labs Reviewed - No data to display          Medical Decision Making:   Differential Diagnosis:   Fx, contusion, sprain/strain, tendonitis  ED Management:  36yo F with atraumatic R wrist pain. No bony abnormality. No significant limitations of ROM. No swelling or skin changes. Low suspicion for infectious process. No radiculopathy or paresthesia. Doubt cubital or carpal tunnel. Good distal pulses and cap refill. Mild bony ttp radial styloid laterally. Without trauma, given overall well appearance and presentation, I do not feel need for imaging.  Will immobilize when able, RICE precautions, tylenol, have pt f/u with PCP. Return precautions given.  Other:   I have discussed this case with another health care provider.       <> Summary of the Discussion: Dr. Brooks                      Clinical Impression:   The encounter diagnosis was Right wrist pain.    Disposition:   Disposition: Discharged  Condition: Stable                        Robin Copeland PA-C  05/30/18 0632

## 2018-05-29 NOTE — ED TRIAGE NOTES
""I thought I was experiencing carpal tunnel 3 weeks ago. But in the last 3 days it's been a little swollen and when I turn it a certain way I hear a clicking noise" (right arm) Pt c/o right wrist pain, right thumb pain. Denies numbness/tingling. Denies taking meds PTA. Pt currently 27 weeks pregnant  "

## 2018-05-29 NOTE — DISCHARGE INSTRUCTIONS
Wear wrist splint when not at work. Tylenol as needed for pain. Continue current care. Return to this ED if any other problems occur. Follow-up with hand surgery if pain persists.

## 2018-06-06 ENCOUNTER — OFFICE VISIT (OUTPATIENT)
Dept: MATERNAL FETAL MEDICINE | Facility: CLINIC | Age: 38
End: 2018-06-06
Payer: COMMERCIAL

## 2018-06-06 ENCOUNTER — LAB VISIT (OUTPATIENT)
Dept: LAB | Facility: OTHER | Age: 38
End: 2018-06-06
Payer: COMMERCIAL

## 2018-06-06 VITALS — DIASTOLIC BLOOD PRESSURE: 76 MMHG | WEIGHT: 177.69 LBS | SYSTOLIC BLOOD PRESSURE: 118 MMHG | BODY MASS INDEX: 34.7 KG/M2

## 2018-06-06 DIAGNOSIS — O09.522 ELDERLY MULTIGRAVIDA IN SECOND TRIMESTER: ICD-10-CM

## 2018-06-06 DIAGNOSIS — Z36.89 ENCOUNTER FOR ULTRASOUND TO CHECK FETAL GROWTH: Primary | ICD-10-CM

## 2018-06-06 DIAGNOSIS — O10.919 CHRONIC HYPERTENSION AFFECTING PREGNANCY: ICD-10-CM

## 2018-06-06 DIAGNOSIS — O99.810 ABNORMAL MATERNAL GLUCOSE TOLERANCE, ANTEPARTUM: Primary | ICD-10-CM

## 2018-06-06 LAB
GLUCOSE SERPL-MCNC: 131 MG/DL
GLUCOSE SERPL-MCNC: 165 MG/DL
GLUCOSE SERPL-MCNC: 178 MG/DL
GLUCOSE SERPL-MCNC: 72 MG/DL

## 2018-06-06 PROCEDURE — 99999 PR PBB SHADOW E&M-EST. PATIENT-LVL II: CPT | Mod: PBBFAC,,, | Performed by: PEDIATRICS

## 2018-06-06 PROCEDURE — 99499 UNLISTED E&M SERVICE: CPT | Mod: S$GLB,,, | Performed by: PEDIATRICS

## 2018-06-06 PROCEDURE — 36415 COLL VENOUS BLD VENIPUNCTURE: CPT

## 2018-06-06 PROCEDURE — 82951 GLUCOSE TOLERANCE TEST (GTT): CPT

## 2018-06-06 PROCEDURE — 76816 OB US FOLLOW-UP PER FETUS: CPT | Mod: S$GLB,,, | Performed by: PEDIATRICS

## 2018-06-06 PROCEDURE — 82952 GTT-ADDED SAMPLES: CPT

## 2018-06-06 NOTE — PROGRESS NOTES
"Indication  ========    F/U Consultation. Follow-up evaluation for fetal growth Follow-up evaluation of anatomy.    History  ======    Risk Factors  Details: Advanced Maternal Age  Details: Chronic Hypertension    Pregnancy History  ==============    Maternal Lab Tests  Test: quad screen  Result:  + DSR    Result:  INFORMA NEG    Maternal Assessment  =================    Weight 81 kg  Weight (lb) 179 lb  BP syst 118 mmHg  BP diast 76 mmHg    Method  ======    Transabdominal ultrasound examination, [place] E10. View: Suboptimal view: limited by fetal position.    Pregnancy  =========    Bella pregnancy. Number of fetuses: 1.    Dating  ======    Cycle: regular cycle  GA by "stated dating" 28 w + 4 d  PARMJIT by "stated dating": 8/25/2018  Ultrasound examination on: 6/6/2018  GA by U/S based upon: AC, BPD, Femur, HC  GA by U/S 28 w + 6 d  PARMJIT by U/S: 8/23/2018  Assigned: Dating performed on 06/6/2018, based on the external assessment  Assigned GA 28 w + 4 d  Assigned PARMJIT: 8/25/2018    General Evaluation  ==============    Cardiac activity: present.  bpm.  Fetal movements: visualized.  Presentation: breech.  Placenta:  Placental site: posterior.  Umbilical cord: Cord vessels: 3 vessel cord.  Amniotic fluid: Amount of AF: normal amount. MVP 5.3 cm.    Fetal Biometry  ============    Fetal Biometry  BPD 67.5 mm 27w 1d Hadlock  OFD 95.2 mm 30w 5d Sagar  .5 mm 28w 3d Hadlock  .8 mm 30w 4d Hadlock  Femur 55.0 mm 29w 0d Hadlock  EFW 1,424 g 57% Dima  Calculated by: Hadlock (BPD-HC-AC-FL)  EFW (lb) 3 lb  EFW (oz) 2 oz  Cephalic index 0.71  HC / AC 0.99  FL / BPD 0.81  FL / AC 0.21  MVP 5.3 cm   bpm    Fetal Anatomy  ============    Cranium: normal  4-chamber view: documented previously  SVC: suboptimal  IVC: suboptimal  Stomach: normal  Kidneys: normal  Bladder: normal          Impression  =========    Fetal size is AGA with the EFW at the 57th percentile.    Normal repeat limited fetal " anatomic survey.  AFV is normal.    3 hour GTT in process.  Growth for AMA.            Recommendation  ==============    1. Return in 4 weeks for growth.  2. ?consult if 3 hour abnormal.    Thank you again for allowing us to participate in the care of your patients. If you have any questions concerning today's consultation, feel free  to contact me or one of my partners. We can be reached at [phone] during normal business hours. If you have a question after normal  business hours, please contact Labor and Delivery at [phone].

## 2018-06-06 NOTE — LETTER
Dx: Closed displaced fracture of left tibial spine , Acute meniscal tear of left knee, subsequent encounter s/p ACL                              Next MD visit: July 26,2017  Fall Risk: standard         Precautions: n/a           Medications: Yes/no: no  Cruz June 6, 2018      Luiz Ernandez MD  [address]  [address]  [place] [address]           [facility] - Maternal Fetal Med  [address] [address]  Phone: [phone]          Patient: Félix Sanchez   MR Number: [id number]   YOB: 1980   Date of Visit: 6/6/2018       Dear Dr. Luiz Ernandez:    Thank you for referring Félix Sanchez to me for evaluation. Attached you will find relevant portions of my assessment and plan of care.    If you have questions, please do not hesitate to call me. I look forward to following Félix Sanchez along with you.    Sincerely,    Nikki Thibodeaux MD    Enclosure  CC:  No Recipients    If you would like to receive this communication electronically, please contact [email or web address] or [phone] to request more information on [employer] access.    For providers and/or their staff who would like to refer a patient to [facility], please contact us through our one-stop-shop provider referral line, [facility], at [phone].    If you feel you have received this communication in error or would no longer like to receive these types of communications, please e-mail [email or web address]          2x10 Step up / lateral step up on 6 inch step x 10 x 2  bosu : lunges x 20 Sideways steps with GTB X 4 laps   Monster walk with GTB  X 4 laps  Quick steps on platform 1 min then straddle steps 1 minx2    QS in supine t hen long sitting  Heel slides 2x10 Sh

## 2018-07-09 ENCOUNTER — OFFICE VISIT (OUTPATIENT)
Dept: MATERNAL FETAL MEDICINE | Facility: CLINIC | Age: 38
End: 2018-07-09
Payer: COMMERCIAL

## 2018-07-09 DIAGNOSIS — O10.919 CHRONIC HYPERTENSION AFFECTING PREGNANCY: ICD-10-CM

## 2018-07-09 DIAGNOSIS — Z36.89 ENCOUNTER FOR ULTRASOUND TO CHECK FETAL GROWTH: ICD-10-CM

## 2018-07-09 PROCEDURE — 76819 FETAL BIOPHYS PROFIL W/O NST: CPT | Mod: S$GLB,,, | Performed by: OBSTETRICS & GYNECOLOGY

## 2018-07-09 PROCEDURE — 76816 OB US FOLLOW-UP PER FETUS: CPT | Mod: S$GLB,,, | Performed by: OBSTETRICS & GYNECOLOGY

## 2018-07-09 PROCEDURE — 99499 UNLISTED E&M SERVICE: CPT | Mod: S$GLB,,, | Performed by: OBSTETRICS & GYNECOLOGY

## 2018-08-06 ENCOUNTER — OFFICE VISIT (OUTPATIENT)
Dept: MATERNAL FETAL MEDICINE | Facility: CLINIC | Age: 38
End: 2018-08-06
Payer: COMMERCIAL

## 2018-08-06 DIAGNOSIS — Z36.89 ENCOUNTER FOR ULTRASOUND TO CHECK FETAL GROWTH: ICD-10-CM

## 2018-08-06 PROCEDURE — 99999 PR PBB SHADOW E&M-EST. PATIENT-LVL I: CPT | Mod: PBBFAC,,,

## 2018-08-06 PROCEDURE — 76816 OB US FOLLOW-UP PER FETUS: CPT | Mod: S$GLB,,, | Performed by: PEDIATRICS

## 2018-08-06 PROCEDURE — 76819 FETAL BIOPHYS PROFIL W/O NST: CPT | Mod: S$GLB,,, | Performed by: PEDIATRICS

## 2018-08-06 PROCEDURE — 99499 UNLISTED E&M SERVICE: CPT | Mod: S$GLB,,, | Performed by: PEDIATRICS

## 2018-08-07 NOTE — PROGRESS NOTES
"Indication  ========    Follow-up evaluation for fetal growth.    History  ======    Risk Factors  Details: Advanced Maternal Age  Details: Chronic Hypertension    Pregnancy History  ==============    Maternal Lab Tests  Test: quad screen  Result:  + DSR    Result:  INFORMA NEG    Method  ======    Transabdominal ultrasound examination. View: Sufficient.    Pregnancy  =========    Bella pregnancy. Number of fetuses: 1.    Dating  ======    Cycle: regular cycle  GA by "stated dating" 37 w + 2 d  PARMJIT by "stated dating": 8/25/2018  Ultrasound examination on: 8/6/2018  GA by U/S based upon: AC, BPD, Femur, HC  GA by U/S 36 w + 6 d  PARMJIT by U/S: 8/28/2018  Assigned: Dating performed on 06/6/2018, based on the external assessment  Assigned GA 37 w + 2 d  Assigned PARMJIT: 8/25/2018    General Evaluation  ==============    Cardiac activity: present.  bpm.  Fetal movements: visualized.  Presentation: cephalic.  Placenta:  Placental site: fundal, posterior.  Umbilical cord: Cord vessels: 3 vessel cord.  Amniotic fluid: Amount of AF: normal amount. MVP 5.8 cm.    Biophysical Profile  ==============    2: Fetal breathing movements  2: Gross body movements  2: Fetal tone  2: Amniotic fluid volume  8/8: Biophysical profile score  Interpretation: normal    Fetal Biometry  ============    Fetal Biometry  BPD 87.7 mm 35w 3d Hadlock  .6 mm  .5 mm 37w 4d Hadlock  .4 mm 39w 0d Hadlock  Femur 69.2 mm 35w 4d Hadlock  EFW 3,270 g 59% Dima  Calculated by: Hadlock (BPD-HC-AC-FL)  EFW (lb) 7 lb  EFW (oz) 3 oz  Cephalic index 0.74  HC / AC 0.94  FL / BPD 0.79  FL / AC 0.20  MVP 5.8 cm   bpm  Head / Face / Neck   4.3 mm    Fetal Anatomy  ============    Cranium: normal  4-chamber view: normal  Stomach: normal  Kidneys: normal  Bladder: normal            Impression  =========    Fetal size is AGA with the EFW at the 59th percentile.    Normal repeat limited fetal anatomic survey.  AFV is " normal.              Recommendation  ==============    1. Continue APT as ordered.  2. Delivery per ACOG recommendations.    Thank you again for allowing us to participate in the care of your patients. If you have any questions concerning today's consultation, feel free  to contact me or one of my partners. We can be reached at [phone] during normal business hours. If you have a question after normal  business hours, please contact Labor and Delivery at [phone].

## 2018-08-11 PROBLEM — O47.9 IRREGULAR CONTRACTIONS: Status: ACTIVE | Noted: 2018-08-11

## 2018-08-12 ENCOUNTER — ANESTHESIA (OUTPATIENT)
Dept: OBSTETRICS AND GYNECOLOGY | Facility: HOSPITAL | Age: 38
End: 2018-08-12
Payer: COMMERCIAL

## 2018-08-12 ENCOUNTER — HOSPITAL ENCOUNTER (INPATIENT)
Facility: HOSPITAL | Age: 38
LOS: 3 days | Discharge: HOME | End: 2018-08-15
Attending: OBSTETRICS & GYNECOLOGY | Admitting: OBSTETRICS & GYNECOLOGY
Payer: COMMERCIAL

## 2018-08-12 ENCOUNTER — ANESTHESIA EVENT (OUTPATIENT)
Dept: OBSTETRICS AND GYNECOLOGY | Facility: HOSPITAL | Age: 38
End: 2018-08-12
Payer: COMMERCIAL

## 2018-08-12 DIAGNOSIS — Z34.90 TERM PREGNANCY: ICD-10-CM

## 2018-08-12 DIAGNOSIS — O47.9 IRREGULAR CONTRACTIONS: ICD-10-CM

## 2018-08-12 LAB
ABO + RH BLD: NORMAL
BASOPHILS # BLD AUTO: 0.02 K/UL
BASOPHILS NFR BLD: 0.2 %
BLD GP AB SCN CELLS X3 SERPL QL: NORMAL
DIFFERENTIAL METHOD: ABNORMAL
EOSINOPHIL # BLD AUTO: 0.1 K/UL
EOSINOPHIL NFR BLD: 0.4 %
ERYTHROCYTE [DISTWIDTH] IN BLOOD BY AUTOMATED COUNT: 16.6 %
HCT VFR BLD AUTO: 38.5 %
HGB BLD-MCNC: 12 G/DL
LYMPHOCYTES # BLD AUTO: 1.8 K/UL
LYMPHOCYTES NFR BLD: 14.9 %
MCH RBC QN AUTO: 25.2 PG
MCHC RBC AUTO-ENTMCNC: 31.2 G/DL
MCV RBC AUTO: 81 FL
MONOCYTES # BLD AUTO: 0.9 K/UL
MONOCYTES NFR BLD: 7.6 %
NEUTROPHILS # BLD AUTO: 9 K/UL
NEUTROPHILS NFR BLD: 76.4 %
PLATELET # BLD AUTO: 264 K/UL
PMV BLD AUTO: 9 FL
RBC # BLD AUTO: 4.77 M/UL
WBC # BLD AUTO: 11.82 K/UL

## 2018-08-12 PROCEDURE — 85025 COMPLETE CBC W/AUTO DIFF WBC: CPT

## 2018-08-12 PROCEDURE — 25000003 PHARM REV CODE 250: Performed by: ANESTHESIOLOGY

## 2018-08-12 PROCEDURE — 62326 NJX INTERLAMINAR LMBR/SAC: CPT | Performed by: ANESTHESIOLOGY

## 2018-08-12 PROCEDURE — 59409 OBSTETRICAL CARE: CPT | Mod: AA,,, | Performed by: ANESTHESIOLOGY

## 2018-08-12 PROCEDURE — 99211 OFF/OP EST MAY X REQ PHY/QHP: CPT

## 2018-08-12 PROCEDURE — S0020 INJECTION, BUPIVICAINE HYDRO: HCPCS | Performed by: ANESTHESIOLOGY

## 2018-08-12 PROCEDURE — 11000001 HC ACUTE MED/SURG PRIVATE ROOM

## 2018-08-12 PROCEDURE — 51702 INSERT TEMP BLADDER CATH: CPT

## 2018-08-12 PROCEDURE — 36415 COLL VENOUS BLD VENIPUNCTURE: CPT

## 2018-08-12 PROCEDURE — 27800516 HC TRAY, EPIDURAL COMBO: Performed by: ANESTHESIOLOGY

## 2018-08-12 PROCEDURE — 72100002 HC LABOR CARE, 1ST 8 HOURS

## 2018-08-12 PROCEDURE — 86592 SYPHILIS TEST NON-TREP QUAL: CPT

## 2018-08-12 PROCEDURE — 63600175 PHARM REV CODE 636 W HCPCS: Performed by: OBSTETRICS & GYNECOLOGY

## 2018-08-12 PROCEDURE — 27200710 HC EPIDURAL INFUSION PUMP SET: Performed by: ANESTHESIOLOGY

## 2018-08-12 PROCEDURE — 25000003 PHARM REV CODE 250: Performed by: OBSTETRICS & GYNECOLOGY

## 2018-08-12 PROCEDURE — 86850 RBC ANTIBODY SCREEN: CPT

## 2018-08-12 RX ORDER — SODIUM CHLORIDE, SODIUM LACTATE, POTASSIUM CHLORIDE, CALCIUM CHLORIDE 600; 310; 30; 20 MG/100ML; MG/100ML; MG/100ML; MG/100ML
INJECTION, SOLUTION INTRAVENOUS CONTINUOUS
Status: DISCONTINUED | OUTPATIENT
Start: 2018-08-12 | End: 2018-08-13

## 2018-08-12 RX ORDER — MEPERIDINE HYDROCHLORIDE 50 MG/ML
50 INJECTION INTRAMUSCULAR; INTRAVENOUS; SUBCUTANEOUS
Status: DISCONTINUED | OUTPATIENT
Start: 2018-08-12 | End: 2018-08-13

## 2018-08-12 RX ORDER — BUPIVACAINE HYDROCHLORIDE 2.5 MG/ML
INJECTION, SOLUTION EPIDURAL; INFILTRATION; INTRACAUDAL
Status: DISCONTINUED | OUTPATIENT
Start: 2018-08-12 | End: 2018-08-13

## 2018-08-12 RX ORDER — BUPIVACAINE HYDROCHLORIDE 2.5 MG/ML
INJECTION, SOLUTION INFILTRATION; PERINEURAL
Status: COMPLETED | OUTPATIENT
Start: 2018-08-12 | End: 2018-08-12

## 2018-08-12 RX ORDER — FENTANYL/BUPIVACAINE/NS/PF 2MCG/ML-.1
PLASTIC BAG, INJECTION (ML) INJECTION CONTINUOUS PRN
Status: DISCONTINUED | OUTPATIENT
Start: 2018-08-12 | End: 2018-08-13

## 2018-08-12 RX ORDER — MISOPROSTOL 200 UG/1
600 TABLET ORAL
Status: DISCONTINUED | OUTPATIENT
Start: 2018-08-12 | End: 2018-08-13

## 2018-08-12 RX ORDER — OXYTOCIN/RINGER'S LACTATE 20/1000 ML
41.65 PLASTIC BAG, INJECTION (ML) INTRAVENOUS CONTINUOUS
Status: ACTIVE | OUTPATIENT
Start: 2018-08-12 | End: 2018-08-13

## 2018-08-12 RX ADMIN — SODIUM CHLORIDE, SODIUM LACTATE, POTASSIUM CHLORIDE, AND CALCIUM CHLORIDE: .6; .31; .03; .02 INJECTION, SOLUTION INTRAVENOUS at 11:08

## 2018-08-12 RX ADMIN — AMPICILLIN SODIUM 1 G: 1 INJECTION, POWDER, FOR SOLUTION INTRAMUSCULAR; INTRAVENOUS at 07:08

## 2018-08-12 RX ADMIN — BUPIVACAINE HYDROCHLORIDE 0.8 ML: 2.5 INJECTION, SOLUTION INFILTRATION; PERINEURAL at 06:08

## 2018-08-12 RX ADMIN — PROMETHAZINE HYDROCHLORIDE 25 MG: 25 INJECTION INTRAMUSCULAR; INTRAVENOUS at 05:08

## 2018-08-12 RX ADMIN — Medication 10 ML/HR: at 06:08

## 2018-08-12 RX ADMIN — SODIUM CHLORIDE, SODIUM LACTATE, POTASSIUM CHLORIDE, AND CALCIUM CHLORIDE: .6; .31; .03; .02 INJECTION, SOLUTION INTRAVENOUS at 03:08

## 2018-08-12 RX ADMIN — BUPIVACAINE HYDROCHLORIDE 0.8 ML: 2.5 INJECTION, SOLUTION EPIDURAL; INFILTRATION; INTRACAUDAL; PERINEURAL at 06:08

## 2018-08-12 RX ADMIN — SODIUM CHLORIDE, SODIUM LACTATE, POTASSIUM CHLORIDE, AND CALCIUM CHLORIDE: .6; .31; .03; .02 INJECTION, SOLUTION INTRAVENOUS at 06:08

## 2018-08-12 RX ADMIN — AMPICILLIN SODIUM 2 G: 2 INJECTION, POWDER, FOR SOLUTION INTRAMUSCULAR; INTRAVENOUS at 03:08

## 2018-08-12 RX ADMIN — AMPICILLIN SODIUM 1 G: 1 INJECTION, POWDER, FOR SOLUTION INTRAMUSCULAR; INTRAVENOUS at 11:08

## 2018-08-12 RX ADMIN — MEPERIDINE HYDROCHLORIDE 50 MG: 50 INJECTION INTRAMUSCULAR; INTRAVENOUS; SUBCUTANEOUS at 05:08

## 2018-08-12 NOTE — NURSING
sve as charted, moderate amount of dark brown discharge to exam glove.  Slight change to sve from yesterday.  Contractions still palpate mild-moderate, soft between. Active fetal movement.  Reviewed poc for monitoring, want to ambulate on unit after 30 min of fetal monitoring, will recheck cervix in 1 hour.  Verbal recall. Large cup of water provided.     1200  Reactive NST.  Wants to ambulate on unit before next sve.  Monitors removed.  Instructed to inform me when back in room.    1315  sve done with no change and brown discharge to exam glove.  States contractions feel the same after walking.  Explained early labor with verbal recall. Informed Dr Olea of complaints, contractions q 7-13 min, sve with no change x 2 today and slight change from yesterday, reactive nst.  Orders rec'd to discharge home with labor precautions,rb&v.    1335 contractions q 4-6 minutes now. Will monitor and recheck cervix at 1415.  Pt in agreement.    1440  Called DR Olea, informed of change to sve and will call Dr Ernandez for admit orders.

## 2018-08-12 NOTE — DISCHARGE INSTRUCTIONS
Breastfeeding discharge instructions given with First Alert form and reviewed.  Also discussed:   AAP recommendation of exclusive breastfeeding for the first 6 months of life and continued breastfeeding with the introduction of supplemental foods beyond the first year of life.  Instructed on the recommendation to delay all bottle and pacifier use until after 4 weeks of age and breastfeeding is well established.  Discussed the benefits of exclusive breastfeeding for both mother and baby.  Discussed the risks of supplementation/pacifier use on the exclusivity of breastfeeding in the first 6 months. Feed the baby at the earliest sign of hunger or comfort  o Hands to mouth, sucking motions  o Rooting or searching for something to suck on  o Dont wait for crying - it is a not a late sign of hunger; it is a sign of distress     The feedings may be 8-12 times per 24hrs and will not follow a schedule   Alternate the breast you start the feeding with, or start with the breast that feels the fullest   Switch breasts when the baby takes himself off the breast or falls asleep   Keep offering breasts until the baby looks full, no longer gives hunger signs, and stays asleep when placed on his back in the crib   If the baby is sleepy and wont wake for a feeding, put the baby skin-to-skin dressed in a diaper against the mothers bare chest   Sleep near your baby   The baby should be positioned and latched on to the breast correctly  o Chest-to-chest, chin in the breast  o Babys lips are flipped outward  o Babys mouth is stretched open wide like a shout  o Babys sucking should feel like tugging to the mother  - The baby should be drinking at the breast:  o You should hear swallowing or gulping throughout the feeding  o You should see milk on the babys lips when he comes off the breast  o Your breasts should be softer when the baby is finished feeding  o The baby should look relaxed at the end of feedings  o After  the 4th day and your milk is in:  o The babys poop should turn bright yellow and be loose, watery, and seedy  o The baby should have at least 3-4 poops the size of the palm of your hand per day  o The baby should have at least 6-8 wet diapers per day  o The urine should be light yellow in color  You should drink when you are thirsty and eat a healthy diet when you are    hungry.     Take naps to get the rest you need.   Take medications and/or drink alcohol only with permission of your obstetrician    or the babys pediatrician.  You can also call the [facility],   ([phone]), Monday-Friday, 8am-5pm Central time, to get the most   up-to-date evidence-based information on the use of medications during   pregnancy and breastfeeding.      The baby should be examined by a pediatrician at 3-5 days of age; unless ordered sooner by the pediatrician.  Once your milk comes in, the baby should be back to birth weight no later than 10-14 days of age.    After a Vaginal Birth    General Discharge Instructions    · May follow a regular diet, unless otherwise discussed with physician.    · Take showers, not baths unless otherwise discussed with physician.    · Activity as tolerated.    · No lifting or heavy exercise for 6 weeks, no driving for 2 weeks, no sexual intercourse, douching or tampons for 6 weeks    · May return to work/school as discussed with physician  · Take medications as directed    · Discuss birth control with physician    · Breast care support bra worn at all times    · Lactation consultant referral number ( [phone] or [phone])    Call Your Healthcare Provider Right Away If You Have:  · A temperature of 100.4°F or higher.  · If your blood pressure is over 155/105.  · You have difficulty catching your breath or trouble breathing.  · Heavy vaginal bleeding, clots, or vaginal discharge with foul odor. (heavier than menses)  · Persistent nausea or vomiting.  · You gain more than 3 pounds in 3  days.  · Severe headaches not relieved by Tylenol (acetaminophen) or Motrin (ibuprofen)  · Blurry or double vision, see spots or flashing lights.  · Dizziness or fainting.  · New onset swelling or worsening of existing swelling.  · Burning or pain when you urinate.  · No bowel movement for 5 days.  · Redness, warmth, swelling, or pain in the lower leg.  · Redness, discharge, or pain worse than you had in the hospital.  · Burning, pain, red streaks, or lumpy areas in your breasts.  · Cracks, blisters, or blood on your nipples.  · Feelings of extreme sadness or anxiety, or a feeling that you dont want to be with your baby.  · If you have any new or unusual symptoms or have questions or concerns    Some of these symptoms can occur up to 4 to 6 weeks after delivery. This can be a sign of pre-eclampsia, which is a serious disease that can cause stroke, seizures, organ damage, or death. Do not wait to call your doctor or seek medical attention.            If You Had Stitches  You may have received stitches in the skin near your vagina. The stitches might have closed an episiotomy (an incision that enlarges the opening of the vagina). Or you may have needed stitches to repair torn skin. Either way, your stitches should dissolve within weeks. Until then, you can help reduce discomfort, aid healing, and reduce your risk of infection by keeping the stitches clean. These tips can help:    · Gently wipe from front to back after you urinate or have a bowel movement.  · After wiping, spray warm water on the area. Or you can have a sitz bath. This means sitting in a tub with a few inches of water in it. Then pat the area dry or use a hairdryer on a cool setting.  · You can take a shower instead of a bath.  · Change sanitary pads at least every 2-4 hours.  · Place cold or heat packs on the area as directed by your doctors or nurses. Keep a thin towel between the pack and your skin.  · Sit on firm seats so the stitches pull  less.     Follow-Up  Schedule a  follow-up exam with your healthcare provider for about 6 weeks after delivery. During this exam, your uterus and vaginal area will be checked. Contact your healthcare provider if you think you or your baby are having any problems.

## 2018-08-12 NOTE — NURSING
Notified Dr Ernandez via phone of pt sve assessment today that changed now to 2.5cm, order rec'd to start ampicillin per protocol for gbs +, D5LR, Demerol 50 mg and phenergan 25 mg q 3 hours prn pain, and epidural per request,rb&v.    1821  Epidural requested. Anesthesia notified.

## 2018-08-12 NOTE — ANESTHESIA PROCEDURE NOTES
CSE    Patient location during procedure: OB  Start time: 8/12/2018 6:31 PM  Timeout: 8/12/2018 6:31 PM  End time: 8/12/2018 6:34 PM  Staffing  Anesthesiologist: Ramon Wilkinson MD  Performed: anesthesiologist   Preanesthetic Checklist  Completed: patient identified, site marked, surgical consent, pre-op evaluation, timeout performed, IV checked, risks and benefits discussed and monitors and equipment checked  CSE  Patient position: sitting  Prep: ChloraPrep and site prepped and draped  Patient monitoring: heart rate, continuous pulse ox and frequent blood pressure checks  Approach: midline  Spinal Needle  Needle type: pencil-tip   Needle gauge: 25 G  Needle length: 5 in  Epidural Needle  Injection technique: MARY air  Needle type: Tuohy   Needle gauge: 17 G  Needle length: 3.5 in  Needle insertion depth: 5 cm  Location: L4-5  Needle localization: anatomical landmarks  Catheter  Catheter type: InRadio  Catheter size: 19 G  Catheter at skin depth: 10 cm  Test dose: lidocaine 1.5% with Epi 1-to-200,000  Additional Documentation: negative aspiration for CSF, negative aspiration for heme and negative test dose

## 2018-08-12 NOTE — ANESTHESIA PREPROCEDURE EVALUATION
08/12/2018  Félix Sanchez is a 37 y.o., female.    Anesthesia Evaluation    I have reviewed the Patient Summary Reports.     I have reviewed the Medications.     Review of Systems  Anesthesia Hx:  No problems with previous Anesthesia    Social:  Non-Smoker, No Alcohol Use    Cardiovascular:   Exercise tolerance: good Hypertension    Endocrine:   Diabetes        Physical Exam  General:  Well nourished    Airway/Jaw/Neck:  AIRWAY FINDINGS: Normal       Dental:  DENTAL FINDINGS: Normal   Chest/Lungs:  Chest/Lungs Clear    Heart/Vascular:  Heart Findings: Normal       Mental Status:  Mental Status Findings:  Cooperative, Alert and Oriented         Anesthesia Plan  Type of Anesthesia, risks & benefits discussed:  Anesthesia Type:  CSE  Patient's Preference:   Intra-op Monitoring Plan:   Intra-op Monitoring Plan Comments:   Post Op Pain Control Plan:   Post Op Pain Control Plan Comments:   Induction:   IV  Beta Blocker:  Patient is not currently on a Beta-Blocker (No further documentation required).       Informed Consent: Patient understands risks and agrees with Anesthesia plan.  Questions answered. Anesthesia consent signed with patient.  ASA Score: 2     Day of Surgery Review of History & Physical:    H&P update referred to the surgeon.         Ready For Surgery From Anesthesia Perspective.

## 2018-08-12 NOTE — PLAN OF CARE
Problem: Labor (Cervical Ripen, Induct, Augment) (Adult,Obstetrics,Pediatric)  Goal: Signs and Symptoms of Listed Potential Problems Will be Absent, Minimized or Managed (Labor)  Signs and symptoms of listed potential problems will be absent, minimized or managed by discharge/transition of care (reference Labor (Cervical Ripen, Induct, Augment) (Adult,Obstetrics,Pediatric) CPG).  Outcome: Ongoing (interventions implemented as appropriate)  Adequate cervical change. Epidural for pain.  Reactive nst. Ampicillin x 1 received for gbs +.  VSS.

## 2018-08-13 PROBLEM — O09.522 AMA (ADVANCED MATERNAL AGE) MULTIGRAVIDA 35+, SECOND TRIMESTER: Status: RESOLVED | Noted: 2018-04-26 | Resolved: 2018-08-13

## 2018-08-13 PROBLEM — Z34.90 TERM PREGNANCY: Status: RESOLVED | Noted: 2018-08-12 | Resolved: 2018-08-13

## 2018-08-13 PROBLEM — O28.0 ABNORMAL QUAD SCREEN: Status: RESOLVED | Noted: 2018-04-26 | Resolved: 2018-08-13

## 2018-08-13 PROBLEM — O10.919 CHRONIC HYPERTENSION AFFECTING PREGNANCY: Status: RESOLVED | Noted: 2018-04-26 | Resolved: 2018-08-13

## 2018-08-13 PROBLEM — O47.9 IRREGULAR CONTRACTIONS: Status: RESOLVED | Noted: 2018-08-11 | Resolved: 2018-08-13

## 2018-08-13 PROBLEM — O47.9 IRREGULAR CONTRACTIONS: Status: ACTIVE | Noted: 2018-08-13

## 2018-08-13 LAB — RPR SER QL: NORMAL

## 2018-08-13 PROCEDURE — 25000003 PHARM REV CODE 250: Performed by: OBSTETRICS & GYNECOLOGY

## 2018-08-13 PROCEDURE — 3E0234Z INTRODUCTION OF SERUM, TOXOID AND VACCINE INTO MUSCLE, PERCUTANEOUS APPROACH: ICD-10-PCS | Performed by: OBSTETRICS & GYNECOLOGY

## 2018-08-13 PROCEDURE — 90471 IMMUNIZATION ADMIN: CPT | Performed by: OBSTETRICS & GYNECOLOGY

## 2018-08-13 PROCEDURE — 72200004 HC VAGINAL DELIVERY LEVEL I

## 2018-08-13 PROCEDURE — 90715 TDAP VACCINE 7 YRS/> IM: CPT | Performed by: OBSTETRICS & GYNECOLOGY

## 2018-08-13 PROCEDURE — 63600175 PHARM REV CODE 636 W HCPCS: Performed by: OBSTETRICS & GYNECOLOGY

## 2018-08-13 PROCEDURE — 11000001 HC ACUTE MED/SURG PRIVATE ROOM

## 2018-08-13 RX ORDER — OXYCODONE AND ACETAMINOPHEN 5; 325 MG/1; MG/1
1 TABLET ORAL EVERY 4 HOURS PRN
Status: DISCONTINUED | OUTPATIENT
Start: 2018-08-13 | End: 2018-08-15 | Stop reason: HOSPADM

## 2018-08-13 RX ORDER — ACETAMINOPHEN 325 MG/1
650 TABLET ORAL EVERY 6 HOURS PRN
Status: DISCONTINUED | OUTPATIENT
Start: 2018-08-13 | End: 2018-08-15 | Stop reason: HOSPADM

## 2018-08-13 RX ORDER — SIMETHICONE 80 MG
1 TABLET,CHEWABLE ORAL EVERY 6 HOURS PRN
Status: DISCONTINUED | OUTPATIENT
Start: 2018-08-13 | End: 2018-08-15 | Stop reason: HOSPADM

## 2018-08-13 RX ORDER — ONDANSETRON 8 MG/1
8 TABLET, ORALLY DISINTEGRATING ORAL EVERY 8 HOURS PRN
Status: DISCONTINUED | OUTPATIENT
Start: 2018-08-13 | End: 2018-08-15 | Stop reason: HOSPADM

## 2018-08-13 RX ORDER — HYDROCORTISONE 25 MG/G
CREAM TOPICAL 3 TIMES DAILY PRN
Status: DISCONTINUED | OUTPATIENT
Start: 2018-08-13 | End: 2018-08-15 | Stop reason: HOSPADM

## 2018-08-13 RX ORDER — OXYTOCIN/RINGER'S LACTATE 20/1000 ML
41.65 PLASTIC BAG, INJECTION (ML) INTRAVENOUS CONTINUOUS
Status: ACTIVE | OUTPATIENT
Start: 2018-08-13 | End: 2018-08-13

## 2018-08-13 RX ORDER — DIPHENHYDRAMINE HCL 25 MG
25 CAPSULE ORAL EVERY 4 HOURS PRN
Status: DISCONTINUED | OUTPATIENT
Start: 2018-08-13 | End: 2018-08-15 | Stop reason: HOSPADM

## 2018-08-13 RX ORDER — DOCUSATE SODIUM 100 MG/1
200 CAPSULE, LIQUID FILLED ORAL 2 TIMES DAILY PRN
Status: DISCONTINUED | OUTPATIENT
Start: 2018-08-13 | End: 2018-08-15 | Stop reason: HOSPADM

## 2018-08-13 RX ORDER — OXYCODONE AND ACETAMINOPHEN 10; 325 MG/1; MG/1
1 TABLET ORAL EVERY 4 HOURS PRN
Status: DISCONTINUED | OUTPATIENT
Start: 2018-08-13 | End: 2018-08-15 | Stop reason: HOSPADM

## 2018-08-13 RX ORDER — MISOPROSTOL 200 UG/1
200 TABLET ORAL 4 TIMES DAILY PRN
Status: ACTIVE | OUTPATIENT
Start: 2018-08-13 | End: 2018-08-14

## 2018-08-13 RX ORDER — AMMONIA 15 % (W/V)
0.3 AMPUL (EA) INHALATION CONTINUOUS PRN
Status: DISCONTINUED | OUTPATIENT
Start: 2018-08-13 | End: 2018-08-15 | Stop reason: HOSPADM

## 2018-08-13 RX ORDER — DIPHENHYDRAMINE HYDROCHLORIDE 50 MG/ML
25 INJECTION INTRAMUSCULAR; INTRAVENOUS EVERY 4 HOURS PRN
Status: DISCONTINUED | OUTPATIENT
Start: 2018-08-13 | End: 2018-08-15 | Stop reason: HOSPADM

## 2018-08-13 RX ORDER — AMOXICILLIN 250 MG
1 CAPSULE ORAL NIGHTLY
Status: DISCONTINUED | OUTPATIENT
Start: 2018-08-13 | End: 2018-08-15 | Stop reason: HOSPADM

## 2018-08-13 RX ORDER — HYDROMORPHONE HYDROCHLORIDE 2 MG/ML
1 INJECTION, SOLUTION INTRAMUSCULAR; INTRAVENOUS; SUBCUTANEOUS EVERY 4 HOURS PRN
Status: DISCONTINUED | OUTPATIENT
Start: 2018-08-13 | End: 2018-08-15 | Stop reason: HOSPADM

## 2018-08-13 RX ORDER — IBUPROFEN 600 MG/1
600 TABLET ORAL EVERY 6 HOURS
Status: DISCONTINUED | OUTPATIENT
Start: 2018-08-13 | End: 2018-08-15 | Stop reason: HOSPADM

## 2018-08-13 RX ADMIN — DOCUSATE SODIUM 200 MG: 100 CAPSULE, LIQUID FILLED ORAL at 11:08

## 2018-08-13 RX ADMIN — IBUPROFEN 600 MG: 600 TABLET ORAL at 12:08

## 2018-08-13 RX ADMIN — SIMETHICONE CHEW TAB 80 MG 80 MG: 80 TABLET ORAL at 11:08

## 2018-08-13 RX ADMIN — IBUPROFEN 600 MG: 600 TABLET ORAL at 11:08

## 2018-08-13 RX ADMIN — BENZOCAINE AND LEVOMENTHOL: 200; 5 SPRAY TOPICAL at 05:08

## 2018-08-13 RX ADMIN — IBUPROFEN 600 MG: 600 TABLET ORAL at 05:08

## 2018-08-13 RX ADMIN — OXYCODONE HYDROCHLORIDE AND ACETAMINOPHEN 1 TABLET: 5; 325 TABLET ORAL at 03:08

## 2018-08-13 RX ADMIN — IBUPROFEN 600 MG: 600 TABLET ORAL at 06:08

## 2018-08-13 RX ADMIN — CLOSTRIDIUM TETANI TOXOID ANTIGEN (FORMALDEHYDE INACTIVATED), CORYNEBACTERIUM DIPHTHERIAE TOXOID ANTIGEN (FORMALDEHYDE INACTIVATED), BORDETELLA PERTUSSIS TOXOID ANTIGEN (GLUTARALDEHYDE INACTIVATED), BORDETELLA PERTUSSIS FILAMENTOUS HEMAGGLUTININ ANTIGEN (FORMALDEHYDE INACTIVATED), BORDETELLA PERTUSSIS PERTACTIN ANTIGEN, AND BORDETELLA PERTUSSIS FIMBRIAE 2/3 ANTIGEN 0.5 ML: 5; 2; 2.5; 5; 3; 5 INJECTION, SUSPENSION INTRAMUSCULAR at 06:08

## 2018-08-13 RX ADMIN — Medication 333 MILLI-UNITS/MIN: at 12:08

## 2018-08-13 NOTE — PROGRESS NOTES
[facility]  Vaginal Delivery Progress Note  Obstetrics    SUBJECTIVE:     Félix Sanchez is a 37 y.o. female PPD #0 status post Spontaneous vaginal delivery at 38w2d in a pregnancy complicated by AMA, CHTN not requiring medication, GDM. Patient is doing  well this evening. She denies  nausea, vomiting, fever or chills. Patient reports mild abdominal pain that is adequately relieved by  oral pain medications. Lochia is mild to moderate  and decreasing. Patient is voiding without difficulty and ambulating with no difficulty. She has passed flatus and has had a BM. Patient does plan to breast feed. BTL planned for contraception.      OBJECTIVE:     Vital Signs Ranges:  Temp:  [96.9 °F (36.1 °C)-99 °F (37.2 °C)] 97.6 °F (36.4 °C)  Pulse:  [] 114  Resp:  [16-20] 18  SpO2:  [75 %-100 %] 98 %  BP: (104-156)/(56-97) 104/56    I/O (Last 24H):    Intake/Output Summary (Last 24 hours) at 8/13/2018 1840  Last data filed at 8/13/2018 1729  Gross per 24 hour   Intake 5460 ml   Output 1469 ml   Net 3991 ml       Physical Exam:  General:    alert, appears stated age and cooperative   Lungs:  clear to auscultation bilaterally   Heart:  regular rate and rhythm, S1, S2 normal, no murmur, click, rub or gallop   Abdomen:  soft, non-tender; bowel sounds normal; no masses,  no organomegaly   Uterine Size:  firm located at the umbilicus.   Incision:  none   Extremities:   peripheral pulses normal, no pedal edema, no clubbing or cyanosis     Lab Review:   [unfilled]    ASSESSMENT:     Assessment:  Active Hospital Problems    Diagnosis    Irregular contractions      PLAN:     Plan:  1. Postpartum care:   - Patient doing well. Continue routine management and advances.   - Continue PO pain meds. Pain well controlled.     - Encourage ambulation         - Lactation breastfeeding        Disposition: As patient meets milestones, will plan to discharge 08/15/2018.

## 2018-08-13 NOTE — PROVIDER TRANSFER
[facility]  Transfer of Care Note      Patient Name: Félix Sanchez  MRN: [id number]  Admission Date: 2018  Hospital Length of Stay: 1 days  Transfer Date: 2018  Attending Physician: Luiz Ernandez MD   Primary Care Provider: Latonia Brenner MD  Reason for Admission: active labor    HPI:   Patient is  at 38 1/7 weeks admitted in active labor with positive cervical changes. Started contractions 2018 in am , came to L&D but was not in labor but came back today 2018 when contractions were stronger. She has AMA, h/o HTN.    Hospital Course:   38 y/o [id number] at 38 1/7 weeks presented in early labor and progressed until she is now 8 cm. Her membranes ruptured spontaneously showing clear fluids. She has already received an epidural and she is comfortable. Her vital signs are stable. There is no sign of fetal distress.  2018 0030 hrs. Patient is now status post  without complications. We expect a routine postpartal course. Female ELISE    No notes have been filed under this hospital service.  Service: Obstetrics and Gynecology    Consults (From admission, onward)        Status Ordering Provider     Inpatient consult to Anesthesiology  Once     Provider:  (Not yet assigned)    Acknowledged LUIZ ERNANDEZ            Diet Orders          Diet NPO: NPO starting at  1106      regular  Activity Orders          None        Pending Diagnostic Studies:     Procedure Component Value Units Date/Time    RPR [[id number]] Collected:  18 1505    Order Status:  Sent Lab Status:  In process Updated:  18 1506    Specimen:  Blood       Routine postpartal care.      Luiz Ernandez MD  [facility]

## 2018-08-13 NOTE — PLAN OF CARE
Problem: Patient Care Overview  Goal: Individualization & Mutuality  Outcome: Ongoing (interventions implemented as appropriate)  Pt. tolerating pain with scheduled motrin and prn percocet, but has not taken percocet this shift. Tolerating diet.  Enc. Pt. To ambulate today in hallway. I&o today. poc reviewed with pt. Bonding well with infant. Vss. Breastfeeding ad grupo with assist from lactation prn. Pt. Having pain with sciatic nerve since during pregnancy, sometimes needs assistance with ambulation when laying or sitting too long, but then it gets better pt. States.

## 2018-08-13 NOTE — PLAN OF CARE
Problem: Patient Care Overview  Goal: Plan of Care Review  Outcome: Ongoing (interventions implemented as appropriate)  Breastfeed according to infant cues at least 8 times in 24 hours, keep feeding and diaper log.

## 2018-08-13 NOTE — LACTATION NOTE
08/13/18 0900   Maternal Infant Assessment   Breast Shape Bilateral:;round   Breast Density soft   Areola elastic   Nipple(s) everted;Bilateral:;graspable  (large)   Infant Assessment   Sucking Reflex present   Rooting Reflex present   Swallow Reflex present   Breasts WDL   Breasts WDL WDL   Maternal Infant Feeding   Maternal Emotional State assist needed;relaxed   Infant Positioning cross-cradle   Signs of Milk Transfer audible swallow   Presence of Pain no   Time Spent (min) 30-60 min   Latch Assistance yes   Engorgement Measures supportive bra encouraged   Breastfeeding Education adequate infant intake;adequate milk volume;diet;importance of skin-to-skin contact;increasing milk supply;milk expression, hand    Following Delivery yes   Breastfeeding History   Currently Breastfeeding yes   Breastfeeding History no   Infant First Feeding   Infant First Feeding breastfeeding   Feeding Infant   Feeding Readiness Cues energy for feeding;rooting;sustained alertness   Feeding Tolerance/Success coordinated suck;coordinated swallow;eager;alert for feeding   Effective Latch During Feeding yes   Audible Swallow yes   Suck/Swallow Coordination present   Lactation Referrals   Lactation Consult Breastfeeding assessment;Initial assessment   Lactation Interventions   Attachment Promotion breastfeeding assistance provided;counseling provided;face-to-face positioning promoted;infant-mother separation minimized;privacy provided;role responsibility promoted;rooming-in promoted;skin-to-skin contact encouraged   Breastfeeding Assistance assisted with positioning;both breasts offered each feeding;feeding cue recognition promoted;feeding on demand promoted;feeding session observed;infant latch-on verified;infant suck/swallow verified;support offered   Maternal Breastfeeding Support encouragement offered;diary/feeding log utilized;infant-mother separation minimized;lactation counseling provided;maternal hydration  promoted;maternal nutrition promoted;maternal rest encouraged   Latch Promotion positioning assisted;infant's mouth opened gently;infant moved to breast;suck stimulated with colostrum drop   Assisted with positioning and latch on. Breastfeeding basics and handout reviewed.

## 2018-08-13 NOTE — HOSPITAL COURSE
38 y/o [id number] at 38 1/7 weeks presented in early labor and progressed until she is now 8 cm. Her membranes ruptured spontaneously showing clear fluids. She has already received an epidural and she is comfortable. Her vital signs are stable. There is no sign of fetal distress.  2018 0030 hrs. Patient is now status post  without complications. We expect a routine postpartal course. Female ELISE  2018 1800 hrs. Patient requests discharge in am

## 2018-08-13 NOTE — NURSING
Ambulated to bathroom with assistance. Ambulated w/o difficulty. Tolerated well. Voided w/o difficulty. pericare completed.  Transferred to  bed w/o any problems

## 2018-08-13 NOTE — H&P
[facility]  Obstetrics  History & Physical    Patient Name: Félix Sanchez  MRN: [id number]  Admission Date: 2018  Primary Care Provider: Latonia Brenner MD    Subjective:     Principal Problem:Term pregnancy    History of Present Illness:  Patient is  at 38 1/7 weeks admitted in active labor with positive cervical changes. Started contractions 2018 in am , came to L&D but was not in labor but came back today 2018 when contractions were stronger. She has AMA, h/o HTN.    Obstetric HPI:  Patient reports Date/time of onset: 2018 at 0400 hours, Frequency: Every 5-10 minutes, Duration: 60 seconds and Intensity: strong contractions, active fetal movement, No vaginal bleeding , Yes loss of fluid while she was here on the unit in labor.    This pregnancy has been complicated by AMA, CHTN not requiring any medications and glucose intolerance (GDM).    Obstetric History       T1      L1     SAB0   TAB1   Ectopic0   Multiple0   Live Births1       # Outcome Date GA Lbr Gildardo/2nd Weight Sex Delivery Anes PTL Lv   3 Current            2 Term 09 39w0d  2.977 kg (6 lb 9 oz) M   N    1 TAB                 Past Medical History:   Diagnosis Date    Advanced maternal age in multigravida, second trimester     Anemia     Diabetes mellitus     gestational diet control     Hypertension      Past Surgical History:   Procedure Laterality Date    UTERINE FIBROID SURGERY      WISDOM TOOTH EXTRACTION         PTA Medications   Medication Sig    aspirin 81 MG Chew Take 81 mg by mouth once daily.    fluticasone (FLONASE) 50 mcg/actuation nasal spray 1 spray by Each Nare route 2 (two) times daily as needed for Rhinitis.    PRENATAL 105-IRON-FOLIC AC-DHA ORAL Take by mouth.    valacyclovir HCl (VALTREX ORAL) Take by mouth once daily.       Review of patient's allergies indicates:  No Known Allergies     Family History     None        Tobacco Use    Smoking  status: Never Smoker    Smokeless tobacco: Never Used   Substance and Sexual Activity    Alcohol use: No     Comment: occasionally drinks a few sips of wine    Drug use: No    Sexual activity: Yes     Partners: Male     Birth control/protection: None     Review of Systems   Constitutional: Negative.    HENT: Negative.    Eyes: Negative.    Respiratory: Negative.    Cardiovascular: Negative.    Gastrointestinal: Positive for abdominal pain.   Endocrine: Negative.    Genitourinary: Negative.    Musculoskeletal: Positive for back pain.   Skin:  Negative.   Neurological: Negative.    Hematological: Negative.    Psychiatric/Behavioral: Negative.    Breast: negative.    All other systems reviewed and are negative.     Objective:     Vital Signs (Most Recent):  Temp: 98.5 °F (36.9 °C) (08/12/18 2125)  Pulse: 93 (08/12/18 2203)  Resp: 20 (08/12/18 2125)  BP: 125/66 (08/12/18 2203)  SpO2: 98 % (08/12/18 2200) Vital Signs (24h Range):  Temp:  [98.5 °F (36.9 °C)-98.8 °F (37.1 °C)] 98.5 °F (36.9 °C)  Pulse:  [] 93  Resp:  [16-20] 20  SpO2:  [75 %-100 %] 98 %  BP: (109-156)/(57-75) 125/66     Weight: 79.8 kg (176 lb)  Body mass index is 34.37 kg/m².    FHT: 150sCat 1 (reassuring)  TOCO:  Q 3 minutes    Physical Exam:   Constitutional: She is oriented to person, place, and time. She appears well-developed and well-nourished.    HENT:   Head: Normocephalic and atraumatic.   Nose: Nose normal.    Eyes: Conjunctivae and EOM are normal. Pupils are equal, round, and reactive to light.    Neck: Normal range of motion. Neck supple.    Cardiovascular: Normal rate, regular rhythm, normal heart sounds and intact distal pulses.     Pulmonary/Chest: Effort normal and breath sounds normal.        Abdominal: Soft. Bowel sounds are normal.             Musculoskeletal: Normal range of motion and moves all extremeties.       Neurological: She is alert and oriented to person, place, and time. She has normal reflexes.    Skin: Skin is  warm and dry.    Psychiatric: She has a normal mood and affect. Her behavior is normal. Judgment and thought content normal.       Cervix:  Dilation:  8  Effacement:  75%  Station: -1  Presentation: Vertex     Significant Labs:  Lab Results   Component Value Date    GROUPTRH B POS 2018       CBC:   Recent Labs   Lab  18   1505   WBC  11.82   RBC  4.77   HGB  12.0   HCT  38.5   PLT  264   MCV  81*   MCH  25.2*   MCHC  31.2*     I have personallly reviewed all pertinent lab results from the last 24 hours.    Assessment/Plan:     37 y.o. female  at 38w1d in active labor for delivery    * Term pregnancy    In active labor. Plan to deliver vaginally if possible            Luiz Ernandez MD  Obstetrics  [facility]

## 2018-08-13 NOTE — SUBJECTIVE & OBJECTIVE
Obstetric HPI:  Patient reports Date/time of onset: 2018 at 0400 hours, Frequency: Every 5-10 minutes, Duration: 60 seconds and Intensity: strong contractions, active fetal movement, No vaginal bleeding , Yes loss of fluid while she was here on the unit in labor.    This pregnancy has been complicated by AMA, CHTN not requiring any medications and glucose intolerance (GDM).    Obstetric History       T1      L1     SAB0   TAB1   Ectopic0   Multiple0   Live Births1       # Outcome Date GA Lbr Gildardo/2nd Weight Sex Delivery Anes PTL Lv   3 Current            2 Term 09 39w0d  2.977 kg (6 lb 9 oz) M   N    1 TAB                 Past Medical History:   Diagnosis Date    Advanced maternal age in multigravida, second trimester     Anemia     Diabetes mellitus     gestational diet control     Hypertension      Past Surgical History:   Procedure Laterality Date    UTERINE FIBROID SURGERY      WISDOM TOOTH EXTRACTION         PTA Medications   Medication Sig    aspirin 81 MG Chew Take 81 mg by mouth once daily.    fluticasone (FLONASE) 50 mcg/actuation nasal spray 1 spray by Each Nare route 2 (two) times daily as needed for Rhinitis.    PRENATAL 105-IRON-FOLIC AC-DHA ORAL Take by mouth.    valacyclovir HCl (VALTREX ORAL) Take by mouth once daily.       Review of patient's allergies indicates:  No Known Allergies     Family History     None        Tobacco Use    Smoking status: Never Smoker    Smokeless tobacco: Never Used   Substance and Sexual Activity    Alcohol use: No     Comment: occasionally drinks a few sips of wine    Drug use: No    Sexual activity: Yes     Partners: Male     Birth control/protection: None     Review of Systems   Constitutional: Negative.    HENT: Negative.    Eyes: Negative.    Respiratory: Negative.    Cardiovascular: Negative.    Gastrointestinal: Positive for abdominal pain.   Endocrine: Negative.    Genitourinary: Negative.    Musculoskeletal: Positive for  back pain.   Skin:  Negative.   Neurological: Negative.    Hematological: Negative.    Psychiatric/Behavioral: Negative.    Breast: negative.    All other systems reviewed and are negative.     Objective:     Vital Signs (Most Recent):  Temp: 98.5 °F (36.9 °C) (08/12/18 2125)  Pulse: 93 (08/12/18 2203)  Resp: 20 (08/12/18 2125)  BP: 125/66 (08/12/18 2203)  SpO2: 98 % (08/12/18 2200) Vital Signs (24h Range):  Temp:  [98.5 °F (36.9 °C)-98.8 °F (37.1 °C)] 98.5 °F (36.9 °C)  Pulse:  [] 93  Resp:  [16-20] 20  SpO2:  [75 %-100 %] 98 %  BP: (109-156)/(57-75) 125/66     Weight: 79.8 kg (176 lb)  Body mass index is 34.37 kg/m².    FHT: 150sCat 1 (reassuring)  TOCO:  Q 3 minutes    Physical Exam:   Constitutional: She is oriented to person, place, and time. She appears well-developed and well-nourished.    HENT:   Head: Normocephalic and atraumatic.   Nose: Nose normal.    Eyes: Conjunctivae and EOM are normal. Pupils are equal, round, and reactive to light.    Neck: Normal range of motion. Neck supple.    Cardiovascular: Normal rate, regular rhythm, normal heart sounds and intact distal pulses.     Pulmonary/Chest: Effort normal and breath sounds normal.        Abdominal: Soft. Bowel sounds are normal.             Musculoskeletal: Normal range of motion and moves all extremeties.       Neurological: She is alert and oriented to person, place, and time. She has normal reflexes.    Skin: Skin is warm and dry.    Psychiatric: She has a normal mood and affect. Her behavior is normal. Judgment and thought content normal.       Cervix:  Dilation:  8  Effacement:  75%  Station: -1  Presentation: Vertex     Significant Labs:  Lab Results   Component Value Date    [facility] B POS 08/12/2018       CBC:   Recent Labs   Lab  08/12/18   1505   WBC  11.82   RBC  4.77   HGB  12.0   HCT  38.5   PLT  264   MCV  81*   MCH  25.2*   MCHC  31.2*     I have personallly reviewed all pertinent lab results from the last 24 hours.

## 2018-08-13 NOTE — HPI
Patient is  at 38 1/7 weeks admitted in active labor with positive cervical changes. Started contractions 2018 in am , came to L&D but was not in labor but came back today 2018 when contractions were stronger. She has AMA, h/o HTN.Tetra Screen was positive for Downs but NIPT was negative. She saw MFM throughout the pregnancy and did not have any issues.

## 2018-08-14 LAB
BASOPHILS # BLD AUTO: 0.02 K/UL
BASOPHILS NFR BLD: 0.1 %
DIFFERENTIAL METHOD: ABNORMAL
EOSINOPHIL # BLD AUTO: 0.2 K/UL
EOSINOPHIL NFR BLD: 1.3 %
ERYTHROCYTE [DISTWIDTH] IN BLOOD BY AUTOMATED COUNT: 16.6 %
HCT VFR BLD AUTO: 34.8 %
HGB BLD-MCNC: 11.2 G/DL
LYMPHOCYTES # BLD AUTO: 2.5 K/UL
LYMPHOCYTES NFR BLD: 18.2 %
MCH RBC QN AUTO: 25.7 PG
MCHC RBC AUTO-ENTMCNC: 32.2 G/DL
MCV RBC AUTO: 80 FL
MONOCYTES # BLD AUTO: 1.4 K/UL
MONOCYTES NFR BLD: 9.9 %
NEUTROPHILS # BLD AUTO: 9.7 K/UL
NEUTROPHILS NFR BLD: 70 %
PLATELET # BLD AUTO: 263 K/UL
PMV BLD AUTO: 9.4 FL
RBC # BLD AUTO: 4.35 M/UL
WBC # BLD AUTO: 13.88 K/UL

## 2018-08-14 PROCEDURE — 85025 COMPLETE CBC W/AUTO DIFF WBC: CPT

## 2018-08-14 PROCEDURE — 36415 COLL VENOUS BLD VENIPUNCTURE: CPT

## 2018-08-14 PROCEDURE — 11000001 HC ACUTE MED/SURG PRIVATE ROOM

## 2018-08-14 PROCEDURE — 25000003 PHARM REV CODE 250: Performed by: OBSTETRICS & GYNECOLOGY

## 2018-08-14 RX ORDER — IBUPROFEN 600 MG/1
600 TABLET ORAL EVERY 6 HOURS
Qty: 40 TABLET | Refills: 1 | Status: SHIPPED | OUTPATIENT
Start: 2018-08-15 | End: 2019-03-27

## 2018-08-14 RX ORDER — ACETAMINOPHEN 325 MG/1
650 TABLET ORAL EVERY 6 HOURS PRN
Refills: 0 | COMMUNITY
Start: 2018-08-14 | End: 2019-03-27

## 2018-08-14 RX ORDER — OXYCODONE AND ACETAMINOPHEN 5; 325 MG/1; MG/1
1 TABLET ORAL EVERY 6 HOURS PRN
Qty: 10 TABLET | Refills: 0 | Status: SHIPPED | OUTPATIENT
Start: 2018-08-14 | End: 2018-12-18 | Stop reason: ALTCHOICE

## 2018-08-14 RX ADMIN — IBUPROFEN 600 MG: 600 TABLET ORAL at 11:08

## 2018-08-14 RX ADMIN — IBUPROFEN 600 MG: 600 TABLET ORAL at 05:08

## 2018-08-14 RX ADMIN — OXYCODONE HYDROCHLORIDE AND ACETAMINOPHEN 1 TABLET: 5; 325 TABLET ORAL at 01:08

## 2018-08-14 RX ADMIN — DOCUSATE SODIUM AND SENNOSIDES 1 TABLET: 8.6; 5 TABLET, FILM COATED ORAL at 09:08

## 2018-08-14 NOTE — PROGRESS NOTES
[facility]  Vaginal Delivery Progress Note  Obstetrics    SUBJECTIVE:     Félix Sanchez is a 37 y.o. female PPPD #1 status post Spontaneous vaginal delivery at 38w2d in a pregnancy complicated by AMA<CHTN< GDM< Betastrep positive. Patient is doing  well this morning. She denies  nausea, vomiting, fever or chills. Patient reports mild abdominal pain that is adequately relieved by  oral pain medications. Lochia is mild to moderate  and decreasing. Patient is voiding without difficulty and ambulating with no difficulty. She has passed flatus and has had a BM. Patient does plan to breast feed. BTL for contraception.      OBJECTIVE:     Vital Signs Ranges:  Temp:  [97.5 °F (36.4 °C)-98.6 °F (37 °C)] 98.6 °F (37 °C)  Pulse:  [63-89] 78  Resp:  [16-20] 18  BP: (118-134)/(66-77) 134/70    I/O (Last 24H):    Intake/Output Summary (Last 24 hours) at 8/14/2018 1823  Last data filed at 8/14/2018 0500  Gross per 24 hour   Intake 2100 ml   Output --   Net 2100 ml       Physical Exam:  General:    alert, appears stated age and cooperative   Lungs:  clear to auscultation bilaterally   Heart:  regular rate and rhythm, S1, S2 normal, no murmur, click, rub or gallop   Abdomen:  soft, non-tender; bowel sounds normal; no masses,  no organomegaly   Uterine Size:  firm located at the umbilicus.   Incision:  none   Extremities:   peripheral pulses normal, no pedal edema, no clubbing or cyanosis     Lab Review:   [unfilled]    ASSESSMENT:     Assessment:  Active Hospital Problems    Diagnosis    Irregular contractions      PLAN:     Plan:  1. Postpartum care:   - Patient doing well. Continue routine management and advances.   - Continue PO pain meds. Pain well controlled.   - Post   Recent Labs   Lab  08/14/18   0611   WBC  13.88*   RBC  4.35   HGB  11.2*   HCT  34.8*   PLT  263   MCV  80*   MCH  25.7*   MCHC  32.2     - Encourage ambulation   -  -     - Lactation breastfeeding      Disposition: As patient meets  milestones, will plan to discharge 08/15/2018.

## 2018-08-14 NOTE — LACTATION NOTE
This note was copied from a baby's chart.     08/14/18 2070   Maternal Infant Assessment   Breast Density Bilateral:;soft;filling   Areola Bilateral:;elastic   Nipple(s) Bilateral:;everted   Infant Assessment   Sucking Reflex present   Rooting Reflex present   Swallow Reflex present   LATCH Score   Latch 2-->grasps breast, tongue down, lips flanged, rhythmic sucking   Audible Swallowing 2-->spontaneous and intermittent (24 hrs old)   Type Of Nipple 2-->everted (after stimulation)   Comfort (Breast/Nipple) 1-->filling, red/small blisters/bruises, mild/mod discomfort   Hold (Positioning) 2-->no assist from staff, mother able to position/hold infant   Score (less than 7 for 2/more consecutive times, consult Lactation Consultant) 9   Maternal Infant Feeding   Maternal Emotional State independent;relaxed   Infant Positioning cradle   Signs of Milk Transfer audible swallow;infant jaw motion present   Presence of Pain no   Time Spent (min) 15-30 min   Latch Assistance no   Breastfeeding Education adequate infant intake;adequate milk volume;increasing milk supply   Breastfeeding History   Currently Breastfeeding yes   Infant First Feeding   Breastfeeding breastfeeding, left side only   Breastfeeding Left Side (min) 30 Min   Feeding Infant   Feeding Readiness Cues rooting;eager   Feeding Tolerance/Success rooting;strong suck;coordinated suck;coordinated swallow   Effective Latch During Feeding yes   Suck/Swallow Coordination present   Lactation Referrals   Lactation Consult Breastfeeding assessment;Follow up   Lactation Interventions   Attachment Promotion counseling provided;infant-mother separation minimized;privacy provided;role responsibility promoted;rooming-in promoted;skin-to-skin contact encouraged   Breast Care: Breastfeeding milk massaged towards nipple   Breastfeeding Assistance feeding cue recognition promoted;feeding on demand promoted;feeding session observed;infant latch-on verified;infant suck/swallow  verified;support offered   Maternal Breastfeeding Support encouragement offered;infant-mother separation minimized;lactation counseling provided   mother has been cluster feeding baby for last few hours -breasts filling and having good output -wants to get baby to sleep and wants to offer pacifier -Discussed the risks of the introduction of an artificial nipple.  Encouraged to put the baby to the breast when feeding cues are present.  Discussed the AAP recommendation of no bottles or pacifiers until at least 1 month of age.  States understanding verbalized appropriate recall.  Pt states that her intention is to offer an artificial nipple at this time.  Request honored.  Instructed that she must provide her own pacifier.

## 2018-08-14 NOTE — PLAN OF CARE
Problem: Breastfeeding (Adult,Obstetrics,Pediatric)  Goal: Signs and Symptoms of Listed Potential Problems Will be Absent, Minimized or Managed (Breastfeeding)  Signs and symptoms of listed potential problems will be absent, minimized or managed by discharge/transition of care (reference Breastfeeding (Adult,Obstetrics,Pediatric) CPG).  Outcome: Ongoing (interventions implemented as appropriate)  Plan breastfeed on demand at least 8 times in 24 hours

## 2018-08-14 NOTE — PLAN OF CARE
Problem: Patient Care Overview  Goal: Plan of Care Review  Pt voiding urine and passing flatus. VSS. No acute distress noted. Vaginal bleed light. Fundus firm. Bonding with infant appropriately. C/o LLE posterior pain that increases during positioning, but is relieved with walking. Pt states she was diagnosed with sciatica during pregnancy. Encouraged motrin and walking. States LLE goes numb during initial ambulation but then it resolves afterwards. Discussed fall risk, and needing assistance with infant. Pt desires to go home today.

## 2018-08-14 NOTE — ANESTHESIA POSTPROCEDURE EVALUATION
Anesthesia Post Evaluation    Patient: Lacordia Triclink Sanchez    Procedure(s) Performed: * No procedures listed *    Final Anesthesia Type: CSE  Patient location during evaluation: labor & delivery  Patient participation: Yes- Able to Participate  Level of consciousness: awake and alert and oriented  Post-procedure vital signs: reviewed and stable  Pain management: adequate  Airway patency: patent  PONV status at discharge: No PONV  Anesthetic complications: no      Cardiovascular status: blood pressure returned to baseline and hemodynamically stable  Respiratory status: unassisted, spontaneous ventilation and room air  Hydration status: euvolemic  Follow-up not needed.        Visit Vitals  /77   Pulse 77   Temp 36.7 °C (98 °F)   Resp 18   Ht 5' (1.524 m)   Wt 79.8 kg (176 lb)   SpO2 98%   Breastfeeding? Unknown   BMI 34.37 kg/m²       Pain/Jo Score: Pain Assessment Performed: Yes (8/14/2018  6:15 AM)  Presence of Pain: complains of pain/discomfort (8/14/2018  6:15 AM)  Pain Rating Prior to Med Admin: 2 (8/14/2018  6:15 AM)  Pain Rating Post Med Admin: 2 (8/14/2018  6:00 AM)

## 2018-08-14 NOTE — PLAN OF CARE
Problem: Patient Care Overview  Goal: Individualization & Mutuality  Outcome: Ongoing (interventions implemented as appropriate)  Pt. tolerating pain with scheduled motrin and prn percocet. Tolerating diet.  Enc. Pt. To ambulate today in hallway.. poc reviewed with pt. Bonding well with infant. Vss. Dc home tomorrow

## 2018-08-14 NOTE — DISCHARGE SUMMARY
[facility]  Obstetrics  Discharge Summary      Patient Name: Félix Sanchez  MRN: [id number]  Admission Date: 2018  Hospital Length of Stay: 2 days  Discharge Date and Time: 08/15/2018  Attending Physician: Luiz Ernandez MD   Discharging Provider: Luiz Ernandez MD  Primary Care Provider: Latonia Brenner MD    HPI: Patient is  at 38 1/7 weeks admitted in active labor with positive cervical changes. Started contractions 2018 in am , came to L&D but was not in labor but came back today 2018 when contractions were stronger. She has AMA, h/o HTN.Tetra Screen was positive for Downs but NIPT was negative. She saw MFM throughout the pregnancy and did not have any issues.    * No surgery found *     Hospital Course:   38 y/o [id number] at 38 1/7 weeks presented in early labor and progressed until she is now 8 cm. Her membranes ruptured spontaneously showing clear fluids. She has already received an epidural and she is comfortable. Her vital signs are stable. There is no sign of fetal distress.  2018 0030 hrs. Patient is now status post  without complications. We expect a routine postpartal course. Female ELISE  2018 1800 hrs. Patient requests discharge in am    Consults (From admission, onward)        Status Ordering Provider     Consult to Lactation  Use PRN     Provider:  (Not yet assigned)    Acknowledged LUIZ ERNANDEZ          Final Active Diagnoses:    Diagnosis Date Noted POA    Irregular contractions [O62.2] 2018 Yes      Problems Resolved During this Admission:    Diagnosis Date Noted Date Resolved POA    PRINCIPAL PROBLEM:  Term pregnancy [Z34.80] 2018 Not Applicable    Irregular contractions [O62.2] 2018 Yes    AMA (advanced maternal age) multigravida 35+, second trimester [O09.522] 2018 Yes    Chronic hypertension affecting pregnancy [O10.919] 2018 Yes     Abnormal quad screen [O28.0] 04/26/2018 08/13/2018 Yes        Labs:   CBC   Recent Labs   Lab  08/14/18   0611   WBC  13.88*   HGB  11.2*   HCT  34.8*   PLT  263    and All labs within the past 24 hours have been reviewed    Feeding Method: breast    Immunizations     Date Immunization Status Dose Route/Site Given by    08/13/18 0156 MMR Incomplete 0.5 mL Subcutaneous/Left deltoid     08/13/18 1822 Tdap Given 0.5 mL Intramuscular/Left deltoid Kerry To RN          Delivery:    Episiotomy: None   Lacerations: None   Repair suture: None   Repair # of packets:     Blood loss (ml): 69     Birth information:  YOB: 2018   Time of birth: 12:15 AM   Sex: female   Delivery type: Vaginal, Spontaneous Delivery   Gestational Age: 38w2d    Delivery Clinician:      Other providers:       Additional  information:  Forceps:    Vacuum:    Breech:    Observed anomalies      Living?:           APGARS  One minute Five minutes Ten minutes   Skin color:         Heart rate:         Grimace:         Muscle tone:         Breathing:         Totals: 8  9        Placenta: Delivered:       appearance    Pending Diagnostic Studies:     None          Discharged Condition: good    Disposition: Home or Self Care    Follow Up:  Follow-up Information     Follow up In 4 weeks.           Follow up In 4 weeks.               Patient Instructions:      Diet Adult Regular     Call MD for:  temperature >100.4     Call MD for:  persistent nausea and vomiting or diarrhea     Call MD for:  severe uncontrolled pain     Call MD for:  redness, tenderness, or signs of infection (pain, swelling, redness, odor or green/yellow discharge around incision site)     Call MD for:  severe persistent headache     Call MD for:  worsening rash     Call MD for:  persistent dizziness, light-headedness, or visual disturbances     Call MD for:  increased confusion or weakness     No dressing needed     Activity as tolerated     Medications:  Current  Discharge Medication List      START taking these medications    Details   acetaminophen (TYLENOL) 325 MG tablet Take 2 tablets (650 mg total) by mouth every 6 (six) hours as needed.  Refills: 0      ibuprofen (ADVIL,MOTRIN) 600 MG tablet Take 1 tablet (600 mg total) by mouth every 6 (six) hours.  Qty: 40 tablet, Refills: 1      oxyCODONE-acetaminophen (PERCOCET) 5-325 mg per tablet Take 1 tablet by mouth every 6 (six) hours as needed for Pain.  Qty: 10 tablet, Refills: 0         CONTINUE these medications which have NOT CHANGED    Details   fluticasone (FLONASE) 50 mcg/actuation nasal spray 1 spray by Each Nare route 2 (two) times daily as needed for Rhinitis.  Qty: 15 g, Refills: 0      PRENATAL 105-IRON-FOLIC AC-DHA ORAL Take by mouth.         STOP taking these medications       aspirin 81 MG Chew Comments:   Reason for Stopping:         valacyclovir HCl (VALTREX ORAL) Comments:   Reason for Stopping:               Luiz Ernandez MD  Obstetrics [facility]

## 2018-08-15 VITALS
WEIGHT: 176 LBS | RESPIRATION RATE: 18 BRPM | SYSTOLIC BLOOD PRESSURE: 126 MMHG | HEART RATE: 87 BPM | TEMPERATURE: 97 F | BODY MASS INDEX: 34.55 KG/M2 | OXYGEN SATURATION: 98 % | HEIGHT: 60 IN | DIASTOLIC BLOOD PRESSURE: 69 MMHG

## 2018-08-15 PROCEDURE — 25000003 PHARM REV CODE 250: Performed by: OBSTETRICS & GYNECOLOGY

## 2018-08-15 RX ADMIN — IBUPROFEN 600 MG: 600 TABLET ORAL at 05:08

## 2018-08-15 NOTE — PLAN OF CARE
Problem: Breastfeeding (Adult,Obstetrics,Pediatric)  Goal: Signs and Symptoms of Listed Potential Problems Will be Absent, Minimized or Managed (Breastfeeding)  Signs and symptoms of listed potential problems will be absent, minimized or managed by discharge/transition of care (reference Breastfeeding (Adult,Obstetrics,Pediatric) CPG).  Outcome: Outcome(s) achieved Date Met: 08/15/18  Plan breastfeed on demand at least 8 times in 24 hours

## 2018-08-15 NOTE — LACTATION NOTE
Instruct the mother to:   Sit upright and lean forward if possible.   Apply warm, wet baby blanket/towel over breasts for a few minutes followed by gentle breast massage.   Form a C with her hand and place it about 1 inch back from the areola with the nipple centered between her thumb and index finger.   Press, compress, relax:  apply pressure in an inward direction toward the breast without stretching the tissue and then compress the breast tissue between her fingers for a few minutes.   Rotate placement of fingers on the breasts to facilitate emptying.   Collect expressed colostrum/ human milk with a spoon/cup and feed immediately to the baby or place it directly into a sterile storage container for later use.  If stored for later use, place the babys breast milk label (with the date and time of collection and the names of medications) on the container.  Educated on proper handling and storage of expressed breastmilk.  Pt states understanding, verbalized appropriate recall and return demonstrated.    Preparation and Hygiene:  1. Shower daily.  2. Wear a clean bra each day and wash daily in warm soapy water.  3. Change wet or moist breast pads frequently.  Moist pads can promote growth of germs.  Actively wash your hands, paying close attention to the area around and under your fingernails, thoroughly with soap and water for 15 seconds before pumping or handling your milk.  Re-wash your hands if you touch anything (scratching your nose, answering the phone, etc) while pumping or handling your milk.   4. Before pumping your breasts, assemble the pump collection kit and have ready the sterile container and labels.  Place these items on a clean surface next to the breast pump.  5. Each time after you have finished pumping, take apart all of the parts of the breast pump collection kit and place them in a separate cleaning container (do not place them in the sink).  Be sure to remove the yellow valve from  the breast shield and separate the white membrane from the yellow valve.  Rinse all of these parts with cool water.  Then use a new sponge and/or bottle brush and dishwashing detergent to clean the parts.  Rinse off the soapy water with cool water and air dry on a clean towel covered with a clean cloth.  All parts may also be washed after each use in the top rack of a .  6. Once each day, sanitize all of the parts of the breast pump collection kit.  This can be done by boiling the kit parts for 10 minutes or by using a Quick Clean Micro-Steam Bag made by [employer]  7. If condensation appears in the tubing, continue to run the pump with the tubing attached for 1-2 minutes or until the tubing is dry.   8. Notify your babys nurse or doctor if you become ill or need to take any medication, even over-the-counter medicines.  Collection and Storage of Expressed Breast milk:         1. Pump your breasts at least 8 or more times every 24 hours.  Double pump (both breasts at the same time) for at least 15-20 minutes using the most suction that is comfortable.    2. Write the date and time of pumping and the name of any medications you are taking on the babys pre-printed hospital identification label.   3.    Do not touch the inside of the storage containers or lids.  4.        Tightly screw the lid onto the container and place immediately into the refrigerator for daily use.  5.    Expressed breast milk should be refrigerated or frozen within 4 hours of pumping.  6.        Do not store expressed breast milk on the door of your refrigerator or freezer             where the temperature is warmer.   7.        Refrigerated milk may be stored for up to 7 days.  At this point it can be moved to the freezer for 6 -12 months.  8.        Thaw frozen breast milk in overnight in the refrigerator.  Once milk is thawed it must be used within 24 hours.  9.        Refrigerated breast milk needs to be warmed to room  temperature.  Warm by leaving unrefrigerated until it reached room temperature, or place sealed bottle into a cup of warm (not boiling) water or use a bottle warmer.               Never warm breast milk in a microwave or boiling water.  Instructed on primary engorgement and precautions.  Discussed:    Typical timing of the onset of engorgement  Signs and symptoms of engorgement  If the milk is flowing, use wet or dry heat applied to the breasts for approximately 10min prior to each feeding as a comfort measure to facilitate the milk ejection reflex    Follow heat treatment with breast massage to soften hard/lumpy areas of the breast    Use unrestricted, frequent, effective feedings    Wake baby to feed if necessary    Avoid pacifier and bottle feedings    Hand express or pump breasts to the point of comfort as needed    Use cold treatments in the form of ice packs/gel packs/ frozen vegetables wrapped in a soft thin cloth and applied to the breasts for approximately 20min after each feeding until engorgement is resolved    Wear comfortable, supportive bra    Take pain medicine as needed    Use anti-inflammatory medications if prescribed by physician

## 2018-08-15 NOTE — PLAN OF CARE
Problem: Patient Care Overview  Goal: Plan of Care Review  Pt voiding urine and passing flatus. VSS. No acute distress noted. Bonding with infant appropriately. Encouraged ambulation and hydration. Vaginal bleed light. Fundus firm.

## 2018-08-15 NOTE — LACTATION NOTE
This note was copied from a baby's chart.     08/15/18 1000   Maternal Infant Assessment   Breast Density Bilateral:;filling   Areola Bilateral:;elastic   Nipple(s) Bilateral:;everted   Infant Assessment   Sucking Reflex present   Rooting Reflex present   Swallow Reflex present   LATCH Score   Latch 2-->grasps breast, tongue down, lips flanged, rhythmic sucking   Audible Swallowing 2-->spontaneous and intermittent (24 hrs old)   Type Of Nipple 2-->everted (after stimulation)   Comfort (Breast/Nipple) 1-->filling, red/small blisters/bruises, mild/mod discomfort   Hold (Positioning) 1-->minimal assist, teach one side: mother does other, staff holds   Score (less than 7 for 2/more consecutive times, consult Lactation Consultant) 8   Maternal Infant Feeding   Maternal Emotional State independent;relaxed   Infant Positioning cradle   Signs of Milk Transfer audible swallow;infant jaw motion present;breasts soften with feeding   Presence of Pain no   Time Spent (min) 15-30 min   Breastfeeding Education adequate infant intake;adequate milk volume;diet;importance of skin-to-skin contact;increasing milk supply;label/storage of breast milk;medication effects;milk expression, hand;milk expression, manual pump;prenatal vitamins continued   Breastfeeding History   Currently Breastfeeding yes   Infant First Feeding   Breastfeeding breastfeeding, left side only   Breastfeeding Left Side (min) 10 Min  (continues feeding )   Feeding Infant   Feeding Readiness Cues rooting;eager;crying   Feeding Tolerance/Success rooting;strong suck;coordinated suck;coordinated swallow   Effective Latch During Feeding yes   Suck/Swallow Coordination present   Equipment Type/Education   Pump Type Other (Comment)   Breast Pump Type manual   Breast Pump Flange Type hard   Breast Pump Flange Size 24 mm   Lactation Referrals   Lactation Consult Breastfeeding assessment;Follow up;Knowledge deficit;Pump teaching   Lactation Interventions   Attachment  Promotion breastfeeding assistance provided;counseling provided;infant-mother separation minimized;privacy provided;role responsibility promoted;rooming-in promoted;skin-to-skin contact encouraged   Breast Care: Breastfeeding milk massaged towards nipple;lanolin to nipple(s) applied   Breastfeeding Assistance assisted with positioning;feeding cue recognition promoted;feeding on demand promoted;feeding session observed;infant latch-on verified;infant suck/swallow verified;milk expression/pumping;support offered;hand held breast pump used   Maternal Breastfeeding Support encouragement offered;infant-mother separation minimized;lactation counseling provided;maternal hydration promoted;maternal nutrition promoted;maternal rest encouraged   Latch Promotion positioning assisted   mother's milk filling in this Am and pumped this morning and supplemented EBM via syringe -encouraged baby back at breast -baby sucking on pacifier now -warned of risks of pacifier and baby moved back to breast and latches easily -takes a minute to get baby into strong sucking and swallowing at breast but once sucking well mother denies discomfort and feels breast softening -reinforced baby or pumping at least 8 times in 24 hours to empty breasts -reinforced baby better than pump and review breastfeeding discharge information -aware of need to monitor wet and dirty diapers over next few days -referred to breastfeeding guide for community resources-states understanding of all,information and questions answered

## 2018-08-17 ENCOUNTER — TELEPHONE (OUTPATIENT)
Dept: OBSTETRICS AND GYNECOLOGY | Facility: HOSPITAL | Age: 38
End: 2018-08-17

## 2018-08-17 NOTE — TELEPHONE ENCOUNTER
"Lactation Telephone Follow up:  Spoke with pt.  Reports breastfeeding well at least 8 - 10 times in 24 hours for about 30 min a feeding.  Reports 3- 4 wets and 4 stools in the last 24 hours.  Does reports some nipple tenderness but continues to nurse.  Currently using lanolin, denies any skin breakdown just soreness.  Discussed sore nipple management and position and latch.  Denies any other c/o or concerns.  Has appt with Dr Pryor on Tuesday, instructed to keep as scheduled.  Hotline # given.  States "understand" and verbalized appropriate recall.  "

## 2018-08-17 NOTE — L&D DELIVERY NOTE
[facility]  Vaginal Delivery   Operative Note    SUMMARY     Normal spontaneous vaginal delivery of live infant, was placed on mothers abdomen for skin to skin and bulb suctioning performed.  Infant delivered position ELISE over intact perineum.  Nuchal cord: No.    Spontaneous delivery of placenta and IV pitocin given noting good uterine tone.  No lacerations noted.  Patient tolerated delivery well. Sponge needle and lap counted correctly x2.    Indications: Term pregnancy  Pregnancy complicated by:   Patient Active Problem List   Diagnosis    Irregular contractions     Admitting GA: 38w2d    Delivery Information for Erika Ambrose    Birth information:  YOB: 2018   Time of birth: 12:15 AM   Sex: female   Head Delivery Date/Time: 8/13/2018 12:15 AM   Delivery type: Vaginal, Spontaneous Delivery   Gestational Age: 38w2d    Delivery Providers    Delivering clinician:  Luiz Ernandez MD   Provider Role    Katie Miller RN Registered Nurse    Loan BUENROSTRO [place] Surgical Tech    Kelli Dorman RN Registered Nurse            Measurements    Weight:  3475 g  Length:           Apgars    Living status:  Living  Apgars:   1 min.:   5 min.:   10 min.:   15 min.:   20 min.:     Skin color:   0  1       Heart rate:   2  2       Reflex irritability:   2  2       Muscle tone:   2  2       Respiratory effort:   2  2       Total:   8  9       Apgars assigned by:  KELLI DORMAN RN         Operative Delivery    Forceps attempted?:  No  Vacuum extractor attempted?:  No         Shoulder Dystocia    Shoulder dystocia present?:  No           Presentation    Presentation:  Vertex           Interventions/Resuscitation    Method:  Bulb Suctioning, Deep Suctioning       Cord    Vessels:  3 vessels  Complications:  None  Delayed Cord Clamping?:  Yes  Cord Clamped Date/Time:  8/13/2018 12:16 AM  Cord Blood Disposition:  Sent with Baby  Gases Sent?:  No  Stem Cell Collection (by MD):  No       Placenta     Placenta delivery date/time:  2018 0017  Placenta removal:  Spontaneous  Placenta appearance:  Intact  Placenta disposition:  discarded           Labor Events:       labor: No     Labor Onset Date/Time:         Dilation Complete Date/Time:         Start Pushing Date/Time:       Rupture Date/Time:              Rupture type:           Fluid Amount:        Fluid Color:        Fluid Odor:        Membrane Status (PeriCalm): SRM (Spontaneous Rupture)      Rupture Date/Time (PeriCalm): 2018 21:15:00      Fluid Amount (PeriCalm): Moderate      Fluid Color (PeriCalm): Clear       steroids:       Antibiotics given for GBS: Yes     Induction:       Indications for induction:        Augmentation:       Indications for augmentation:       Labor complications: None     Additional complications:          Cervical ripening:                     Delivery:      Episiotomy: None     Indication for Episiotomy:       Perineal Lacerations: None Repaired:      Periurethral Laceration:   Repaired:     Labial Laceration:   Repaired:     Sulcus Laceration:   Repaired:     Vaginal Laceration:   Repaired:     Cervical Laceration:   Repaired:     Repair suture: None     Repair # of packets:       Vaginal delivery QBL (mL): 69      QBL (mL): 0     Combined Blood Loss (mL): 69     Vaginal Sweep Performed: Yes     Surgicount Correct: Yes       Other providers:       Anesthesia    Method:  Epidural          Details (if applicable):  Trial of Labor      Categorization:      Priority:     Indications for :     Incision Type:       Additional  information:  Forceps:    Vacuum:    Breech:    Observed anomalies    Other (Comments):

## 2018-12-18 ENCOUNTER — OFFICE VISIT (OUTPATIENT)
Dept: OBSTETRICS AND GYNECOLOGY | Facility: CLINIC | Age: 38
End: 2018-12-18
Payer: COMMERCIAL

## 2018-12-18 VITALS
DIASTOLIC BLOOD PRESSURE: 86 MMHG | TEMPERATURE: 99 F | SYSTOLIC BLOOD PRESSURE: 140 MMHG | BODY MASS INDEX: 30.64 KG/M2 | HEIGHT: 60 IN | WEIGHT: 156.06 LBS

## 2018-12-18 DIAGNOSIS — D25.9 UTERINE LEIOMYOMA, UNSPECIFIED LOCATION: Primary | ICD-10-CM

## 2018-12-18 PROCEDURE — 3008F BODY MASS INDEX DOCD: CPT | Mod: CPTII,S$GLB,, | Performed by: OBSTETRICS & GYNECOLOGY

## 2018-12-18 PROCEDURE — 99999 PR PBB SHADOW E&M-EST. PATIENT-LVL III: CPT | Mod: PBBFAC,,, | Performed by: OBSTETRICS & GYNECOLOGY

## 2018-12-18 PROCEDURE — 99214 OFFICE O/P EST MOD 30 MIN: CPT | Mod: S$GLB,,, | Performed by: OBSTETRICS & GYNECOLOGY

## 2018-12-18 RX ORDER — GABAPENTIN 300 MG/1
300 CAPSULE ORAL NIGHTLY
COMMUNITY
Start: 2018-12-09 | End: 2021-08-09

## 2018-12-18 RX ORDER — VALACYCLOVIR HYDROCHLORIDE 1 G/1
TABLET, FILM COATED ORAL
COMMUNITY
Start: 2018-12-17 | End: 2019-07-25

## 2018-12-18 RX ORDER — NAPROXEN 500 MG/1
TABLET ORAL
COMMUNITY
Start: 2018-10-11 | End: 2019-02-26 | Stop reason: SDUPTHER

## 2018-12-18 RX ORDER — ACETAMINOPHEN AND CODEINE PHOSPHATE 120; 12 MG/5ML; MG/5ML
SOLUTION ORAL
Status: ON HOLD | COMMUNITY
Start: 2018-11-30 | End: 2019-05-15 | Stop reason: HOSPADM

## 2018-12-18 RX ORDER — NIFEDIPINE 30 MG/1
30 TABLET, FILM COATED, EXTENDED RELEASE ORAL DAILY
COMMUNITY
Start: 2018-11-30 | End: 2019-07-08 | Stop reason: SDUPTHER

## 2018-12-18 NOTE — PROGRESS NOTES
Subjective:       Patient ID: Félix Sanchez is a 38 y.o. female.    Chief Complaint:  Consult (NP here for consult on surgery)      History of Present Illness  HPI  Patient referred by Dr Ernandez  History of uterine fibroids.  Status post myomectomy in   But since has had two vaginal deliveries.  Last delivery on 2018  Still breast-feeding.    Told to have gestational diabetes.  But normal 3hr OGTT in chart.  Chronic hypertension on medications.    Pelvic pressure.  Would like to get hysterectomy.  No longer would like to have anymore children.    Per patient, Dr Ernandez had done an ultrasound and Pap earlier this year.          GYN & OB History  Patient's last menstrual period was 2018 (exact date).   Date of Last Pap: No result found    OB History    Para Term  AB Living   3 2 2   1 2   SAB TAB Ectopic Multiple Live Births   0 1 0 0 2      # Outcome Date GA Lbr Gildardo/2nd Weight Sex Delivery Anes PTL Lv   3 Term 18 38w2d  3.475 kg (7 lb 10.6 oz) F Vag-Spont EPI N JESS   2 Term 09 39w0d  2.977 kg (6 lb 9 oz) M Vag-Spont  N    1 TAB                 Past Medical History:   Diagnosis Date    Advanced maternal age in multigravida, second trimester     Anemia     Diabetes mellitus     gestational diet-control     Hypertension        Past Surgical History:   Procedure Laterality Date    UTERINE FIBROID SURGERY      WISDOM TOOTH EXTRACTION         Family History   Problem Relation Age of Onset    Hypertension Paternal Grandmother     Stroke Maternal Grandmother     Hypertension Father     Hypertension Mother     COPD Mother         smoker       Social History     Socioeconomic History    Marital status:      Spouse name: None    Number of children: None    Years of education: None    Highest education level: None   Social Needs    Financial resource strain: None    Food insecurity - worry: None    Food insecurity - inability: None    Transportation  needs - medical: None    Transportation needs - non-medical: None   Occupational History    None   Tobacco Use    Smoking status: Never Smoker    Smokeless tobacco: Never Used   Substance and Sexual Activity    Alcohol use: No     Comment: occasionally drinks a few sips of wine    Drug use: No    Sexual activity: Yes     Partners: Male     Birth control/protection: None   Other Topics Concern    None   Social History Narrative    Together since 9/2016    He is a cook at [employer]    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  0    fluticasone (FLONASE) 50 mcg/actuation nasal spray 1 spray by Each Nare route 2 (two) times daily as needed for Rhinitis. 15 g 0    gabapentin (NEURONTIN) 300 MG capsule       ibuprofen (ADVIL,MOTRIN) 600 MG tablet Take 1 tablet (600 mg total) by mouth every 6 (six) hours. 40 tablet 1    naproxen (NAPROSYN) 500 MG tablet       NIFEdipine (ADALAT CC) 30 MG TbSR       norethindrone (MICRONOR) 0.35 mg tablet       PRENATAL 105-IRON-FOLIC AC-DHA ORAL Take by mouth.      valACYclovir (VALTREX) 1000 MG tablet        No current facility-administered medications for this visit.        Review of patient's allergies indicates:  No Known Allergies    Review of Systems  Review of Systems   Constitutional: Negative for activity change, appetite change, chills, fatigue, fever and unexpected weight change.   HENT: Negative for mouth sores.    Respiratory: Negative for cough, shortness of breath and wheezing.    Cardiovascular: Negative for chest pain and palpitations.   Gastrointestinal: Negative for abdominal pain, bloating, blood in stool, constipation, nausea and vomiting.   Endocrine: Negative for diabetes and hot flashes.   Genitourinary: Positive for pelvic pain. Negative for  "dyspareunia, dysuria, frequency, hematuria, menorrhagia, menstrual problem, urgency, vaginal bleeding, vaginal discharge, vaginal pain, dysmenorrhea, urinary incontinence, postcoital bleeding and vaginal odor.        Pelvic pressure   Musculoskeletal: Negative for back pain and myalgias.   Neurological: Negative for seizures and headaches.   Psychiatric/Behavioral: Negative for depression and sleep disturbance. The patient is not nervous/anxious.    Breast: Negative for mass, mastodynia and nipple discharge          Objective:    Physical Exam:   Constitutional: She appears well-developed and well-nourished. No distress.    HENT:   Head: Normocephalic and atraumatic.    Eyes: EOM are normal.    Neck: Normal range of motion.     Pulmonary/Chest: Effort normal. No respiratory distress.        Abdominal: Soft. She exhibits no distension. There is no tenderness. There is no rebound and no guarding.   Umbilical incision     Genitourinary: Vagina normal. No vaginal discharge found.   Genitourinary Comments: Vulva without any obvious lesions.  Vaginal vault with good support.  Minimal white discharge noted.  No obvious lesion.  Cervix is without any cervical motion tenderness.  No obvious lesion.  Uterus is slightly enlarged, non-tender, normal contour.  Adnexa is without any masses or tenderness.           Musculoskeletal: Normal range of motion.       Neurological: She is alert.    Skin: Skin is warm and dry.    Psychiatric: She has a normal mood and affect.          Assessment:        1. Uterine leiomyoma, unspecified location             Plan:      I have extensively discussed with the patient regarding her condition.  She would like to get hysterectomy soon.  Does not want to get myomectomy just to have to worry about fibroids "coming back again"    We will obtain records from Dr Ernandez.  Will review charts and further discuss plan.    Possible Total Laparoscopic Hysterectomy with Bilateral Salpingectomy      "

## 2019-01-03 ENCOUNTER — TELEPHONE (OUTPATIENT)
Dept: OBSTETRICS AND GYNECOLOGY | Facility: CLINIC | Age: 39
End: 2019-01-03

## 2019-01-03 NOTE — TELEPHONE ENCOUNTER
----- Message from Gisela Huntley sent at 1/3/2019 10:05 AM CST -----  Contact: self - [phone]  Pt would like to know if Dr. Mcgee has received U/S another doctor's office.     Attempted to contact patient in regards to outside records of ultrasound. Patient has mailbox that id full, unable to leave message. No ultrasound found at this time

## 2019-02-13 ENCOUNTER — OFFICE VISIT (OUTPATIENT)
Dept: ORTHOPEDICS | Facility: CLINIC | Age: 39
End: 2019-02-13
Attending: ORTHOPAEDIC SURGERY
Payer: COMMERCIAL

## 2019-02-13 VITALS — WEIGHT: 156.06 LBS | HEIGHT: 60 IN | BODY MASS INDEX: 30.64 KG/M2

## 2019-02-13 DIAGNOSIS — M65.4 DE QUERVAIN'S TENOSYNOVITIS, RIGHT: ICD-10-CM

## 2019-02-13 PROCEDURE — 99999 PR PBB SHADOW E&M-EST. PATIENT-LVL II: CPT | Mod: PBBFAC,,, | Performed by: ORTHOPAEDIC SURGERY

## 2019-02-13 PROCEDURE — 20550 NJX 1 TENDON SHEATH/LIGAMENT: CPT | Mod: RT,S$GLB,, | Performed by: ORTHOPAEDIC SURGERY

## 2019-02-13 PROCEDURE — 20550 PR INJECT TENDON SHEATH/LIGAMENT: ICD-10-PCS | Mod: RT,S$GLB,, | Performed by: ORTHOPAEDIC SURGERY

## 2019-02-13 PROCEDURE — 99203 OFFICE O/P NEW LOW 30 MIN: CPT | Mod: 25,S$GLB,, | Performed by: ORTHOPAEDIC SURGERY

## 2019-02-13 PROCEDURE — 3008F PR BODY MASS INDEX (BMI) DOCUMENTED: ICD-10-PCS | Mod: CPTII,S$GLB,, | Performed by: ORTHOPAEDIC SURGERY

## 2019-02-13 PROCEDURE — 99999 PR PBB SHADOW E&M-EST. PATIENT-LVL II: ICD-10-PCS | Mod: PBBFAC,,, | Performed by: ORTHOPAEDIC SURGERY

## 2019-02-13 PROCEDURE — 3008F BODY MASS INDEX DOCD: CPT | Mod: CPTII,S$GLB,, | Performed by: ORTHOPAEDIC SURGERY

## 2019-02-13 PROCEDURE — 99203 PR OFFICE/OUTPT VISIT, NEW, LEVL III, 30-44 MIN: ICD-10-PCS | Mod: 25,S$GLB,, | Performed by: ORTHOPAEDIC SURGERY

## 2019-02-13 RX ORDER — TRIAMCINOLONE ACETONIDE 40 MG/ML
20 INJECTION, SUSPENSION INTRA-ARTICULAR; INTRAMUSCULAR
Status: COMPLETED | OUTPATIENT
Start: 2019-02-13 | End: 2019-02-13

## 2019-02-13 RX ADMIN — TRIAMCINOLONE ACETONIDE 20 MG: 40 INJECTION, SUSPENSION INTRA-ARTICULAR; INTRAMUSCULAR at 04:02

## 2019-02-13 NOTE — PROGRESS NOTES
INITIAL VISIT:  A 38-year-old female presents for evaluation of right wrist pain   of about six months' duration.  She delivered a baby six months ago and seemed   to have onset of symptoms around at that time.  She did have an injection about   three or four months ago into the right wrist, which did relieve symptoms for a   few months, but now symptoms have recurred.  She does describe pain at the base   of the right thumb on the radial side of the right wrist.  Symptoms are worse   with lifting and gripping.  She did have a wrist brace, but seems to have lost   it and does not have another one.  No numbness or tingling is reported.  No   trauma reported.    PAST MEDICAL HISTORY:  Significant for anemia, diabetes and hypertension.    PAST SURGICAL HISTORY:  Includes uterine fibroid surgery and wisdom tooth   extraction.    FAMILY HISTORY:  Positive for hypertension, stroke, COPD.    SOCIAL HISTORY:  The patient does not smoke or drink.    REVIEW OF SYSTEMS:  Negative fever, chills, rashes.    CURRENT MEDICATIONS:  Reviewed on chart.    ALLERGIES:  None.    PHYSICAL EXAMINATION:  GENERAL:  Well-developed, well-nourished female in no acute distress, alert and   oriented x3.  MUSCULOSKELETAL:  Examination of upper extremities significant for the right   hand and wrist, demonstrating tenderness over the first dorsal compartment,   right wrist.  Positive Finkelstein test.  Slight swelling.  No instability.    Tinel sign negative.  No x-rays.    IMPRESSION:  De Quervain tendinitis, right wrist.    PLAN:  I explained the nature of this problem to the patient.  We discussed   options for treatment including injection versus surgery.    She would like to try an injection today.  After pause for timeout, she   identified the right wrist, injected first dorsal compartment with combination   of Kenalog 20 mg, 0.5 mL Xylocaine, sterile technique.  She tolerated the   procedure well without complication.    I have also given  her a wrist brace, which I would like her to use for lifting   and especially holding her baby.  Follow up in 6-8 weeks.      AMY/JASVIR  dd: 02/13/2019 15:58:57 (CST)  td: 02/14/2019 06:53:59 (CST)  Doc ID   #[id number]  Job ID #[id number]    CC:

## 2019-02-13 NOTE — LETTER
February 13, 2019        Latonia Brenner MD  [address] [facility] [address]             [facility] [address]  [address], [address]  [facility] [address]  Phone: [phone]   Patient: Félix Sanchez   MR Number: [id number]   YOB: 1980   Date of Visit: 2/13/2019       Dear Dr. Brenner:    Thank you for referring Félix Sanchez to me for evaluation. Below are the relevant portions of my assessment and plan of care.            If you have questions, please do not hesitate to call me. I look forward to following Félix along with you.    Sincerely,      Evelio Au Jr., MD           CC  No Recipients

## 2019-02-22 ENCOUNTER — TELEPHONE (OUTPATIENT)
Dept: ORTHOPEDICS | Facility: CLINIC | Age: 39
End: 2019-02-22

## 2019-02-22 NOTE — TELEPHONE ENCOUNTER
----- Message from Lillian Reilly sent at 2/22/2019  2:09 PM CST -----  Contact: pt  Name of Who is Calling:        What is the request in detail: Patient is requesting a call from staff she states the injection she received for pain is not working and she needs to know what she can do for the pain  .....Please contact to further discuss and advise.     Can the clinic reply by [facility]: yes     What Number to Call Back if not in [facility]: [phone]

## 2019-02-26 RX ORDER — NAPROXEN 500 MG/1
500 TABLET ORAL 2 TIMES DAILY WITH MEALS
Qty: 60 TABLET | Refills: 1 | Status: SHIPPED | OUTPATIENT
Start: 2019-02-26 | End: 2019-05-21 | Stop reason: CLARIF

## 2019-03-18 ENCOUNTER — TELEPHONE (OUTPATIENT)
Dept: OBSTETRICS AND GYNECOLOGY | Facility: CLINIC | Age: 39
End: 2019-03-18

## 2019-03-18 NOTE — TELEPHONE ENCOUNTER
----- Message from Vibha Crawley sent at 3/18/2019  3:01 PM CDT -----  Contact: Self   Patient called to check the status of her previous message regarding her ultrasound. Please call at [phone].

## 2019-03-18 NOTE — TELEPHONE ENCOUNTER
Spoke with pt. Pt informed that ultrasound results from [facility] were received and scanned. Pt wanted me to ask Dr. Mcgee to review it and advise her on what she needs to do next. Pt informed I would route a message to him and contact her with his response. Pt voiced understanding.

## 2019-03-27 ENCOUNTER — OFFICE VISIT (OUTPATIENT)
Dept: OBSTETRICS AND GYNECOLOGY | Facility: CLINIC | Age: 39
End: 2019-03-27
Payer: COMMERCIAL

## 2019-03-27 VITALS
SYSTOLIC BLOOD PRESSURE: 132 MMHG | BODY MASS INDEX: 29.39 KG/M2 | WEIGHT: 149.69 LBS | HEIGHT: 60 IN | DIASTOLIC BLOOD PRESSURE: 82 MMHG

## 2019-03-27 DIAGNOSIS — Z86.018 HISTORY OF UTERINE FIBROID: ICD-10-CM

## 2019-03-27 DIAGNOSIS — Z98.890 STATUS POST MYOMECTOMY: ICD-10-CM

## 2019-03-27 DIAGNOSIS — Z30.09 STERILIZATION CONSULT: Primary | ICD-10-CM

## 2019-03-27 PROCEDURE — 99999 PR PBB SHADOW E&M-EST. PATIENT-LVL III: CPT | Mod: PBBFAC,,, | Performed by: OBSTETRICS & GYNECOLOGY

## 2019-03-27 PROCEDURE — 99213 OFFICE O/P EST LOW 20 MIN: CPT | Mod: S$GLB,,, | Performed by: OBSTETRICS & GYNECOLOGY

## 2019-03-27 PROCEDURE — 3008F BODY MASS INDEX DOCD: CPT | Mod: CPTII,S$GLB,, | Performed by: OBSTETRICS & GYNECOLOGY

## 2019-03-27 PROCEDURE — 99213 PR OFFICE/OUTPT VISIT, EST, LEVL III, 20-29 MIN: ICD-10-PCS | Mod: S$GLB,,, | Performed by: OBSTETRICS & GYNECOLOGY

## 2019-03-27 PROCEDURE — 3008F PR BODY MASS INDEX (BMI) DOCUMENTED: ICD-10-PCS | Mod: CPTII,S$GLB,, | Performed by: OBSTETRICS & GYNECOLOGY

## 2019-03-27 PROCEDURE — 99999 PR PBB SHADOW E&M-EST. PATIENT-LVL III: ICD-10-PCS | Mod: PBBFAC,,, | Performed by: OBSTETRICS & GYNECOLOGY

## 2019-03-27 NOTE — PATIENT INSTRUCTIONS
You are scheduled for laparoscopic tubal cauterization on 5/15/2019 at 0730  Please come back on 5/8/2019 for preop

## 2019-03-27 NOTE — PROGRESS NOTES
Subjective:       Patient ID: Félix Sanchez is a 38 y.o. female.    Chief Complaint:  Consult (surgery consult and follow up Ultrasound)      History of Present Illness  HPI  Patient comes in today for follow-up  Seen on 2019  Referred by Dr Ernandez  History of uterine fibroids.  Status post myomectomy in   But since has had two vaginal deliveries.  Last delivery on 2018  Still breast-feeding.     Told to have gestational diabetes.  But normal 3hr OGTT in chart.  Chronic hypertension on medications.     Pelvic pressure.  Would like to get hysterectomy.  No longer would like to have anymore children.     Per patient, Dr Ernandez had done an ultrasound and Pap earlier this year.      Review of pelvic ultrasound done at [facility] on 2018 show normal uterus at 7.5 x 4.2 x 6.6 cm with NO fibroid.  Endometrial thickness at 3 mm.  Normal ovaries bilaterally.  Normal abdominal ultrasound on the same day.    GYN & OB History  No LMP recorded. (Menstrual status: Birth Control).   Date of Last Pap: No result found    OB History    Para Term  AB Living   3 2 2   1 2   SAB TAB Ectopic Multiple Live Births   0 1 0 0 2      # Outcome Date GA Lbr Gildardo/2nd Weight Sex Delivery Anes PTL Lv   3 Term 18 38w2d  3.475 kg (7 lb 10.6 oz) F Vag-Spont EPI N JESS   2 Term 09 39w0d  2.977 kg (6 lb 9 oz) M Vag-Spont  N    1 TAB              Past Medical History:   Diagnosis Date    Advanced maternal age in multigravida, second trimester     Anemia     Diabetes mellitus     gestational diet-control     Hypertension        Past Surgical History:   Procedure Laterality Date    UTERINE FIBROID SURGERY      WISDOM TOOTH EXTRACTION         Family History   Problem Relation Age of Onset    Hypertension Paternal Grandmother     Stroke Maternal Grandmother     Hypertension Father     Hypertension Mother     COPD Mother         smoker       Social History     Socioeconomic History    Marital  status:      Spouse name: None    Number of children: None    Years of education: None    Highest education level: None   Occupational History    None   Social Needs    Financial resource strain: None    Food insecurity:     Worry: None     Inability: None    Transportation needs:     Medical: None     Non-medical: None   Tobacco Use    Smoking status: Never Smoker    Smokeless tobacco: Never Used   Substance and Sexual Activity    Alcohol use: No     Comment: occasionally drinks a few sips of wine    Drug use: No    Sexual activity: Yes     Partners: Male     Birth control/protection: None   Lifestyle    Physical activity:     Days per week: None     Minutes per session: None    Stress: None   Relationships    Social connections:     Talks on phone: None     Gets together: None     Attends Taoism service: None     Active member of club or organization: None     Attends meetings of clubs or organizations: None     Relationship status: None    Intimate partner violence:     Fear of current or ex partner: None     Emotionally abused: None     Physically abused: None     Forced sexual activity: None   Other Topics Concern    None   Social History Narrative    Together since 9/2016    He is a cook at [employer]    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 60 tablet 1    NIFEdipine (ADALAT CC) 30 MG TbSR       norethindrone (MICRONOR) 0.35 mg tablet       PRENATAL 105-IRON-FOLIC AC-DHA ORAL Take by mouth.      valACYclovir (VALTREX) 1000 MG tablet        No current facility-administered medications for this visit.        Review of patient's allergies indicates:  No Known Allergies    Review of Systems  Review of Systems   Constitutional: Negative  for activity change, appetite change, chills, fatigue, fever and unexpected weight change.   HENT: Negative for mouth sores.    Respiratory: Negative for cough, shortness of breath and wheezing.    Cardiovascular: Negative for chest pain and palpitations.   Gastrointestinal: Negative for abdominal pain, bloating, blood in stool, constipation, nausea and vomiting.   Endocrine: Negative for diabetes and hot flashes.   Genitourinary: Positive for pelvic pain. Negative for dysmenorrhea, dyspareunia, dysuria, frequency, hematuria, menorrhagia, menstrual problem, urgency, vaginal bleeding, vaginal discharge, vaginal pain, urinary incontinence, postcoital bleeding and vaginal odor.        Pelvic pressure   Musculoskeletal: Negative for back pain and myalgias.   Integumentary:  Negative for rash, breast mass and nipple discharge.   Neurological: Negative for seizures and headaches.   Psychiatric/Behavioral: Negative for depression and sleep disturbance. The patient is not nervous/anxious.    Breast: Negative for mass, mastodynia and nipple discharge          Objective:    Physical Exam:   Constitutional: She appears well-developed and well-nourished. No distress.    HENT:   Head: Normocephalic and atraumatic.    Eyes: EOM are normal.    Neck: Normal range of motion.    Cardiovascular: Normal rate.     Pulmonary/Chest: Effort normal. No respiratory distress.                  Musculoskeletal: Normal range of motion.       Neurological: She is alert.    Skin: Skin is warm and dry.    Psychiatric: She has a normal mood and affect.          Assessment:        1. Sterilization consult    2. History of uterine fibroid    3. Status post myomectomy              Plan:      I have discussed with the patient regarding her condition  She does NOT currently have any evidence of recurrence of uterine fibroids.  No clear indication for hysterectomy.  After extensive discussion, she requests sterilization.  No longer wishes to have any more  children.  She was then scheduled for laparoscopic tubal cauterization on 5/15/2019.  Procedure explained  Questions answered.  She will be back on 5/8/2019 for preop

## 2019-04-03 ENCOUNTER — OFFICE VISIT (OUTPATIENT)
Dept: ORTHOPEDICS | Facility: CLINIC | Age: 39
End: 2019-04-03
Attending: ORTHOPAEDIC SURGERY
Payer: COMMERCIAL

## 2019-04-03 VITALS — BODY MASS INDEX: 29.39 KG/M2 | HEIGHT: 60 IN | WEIGHT: 149.69 LBS

## 2019-04-03 DIAGNOSIS — M65.4 DE QUERVAIN'S TENOSYNOVITIS, RIGHT: Primary | ICD-10-CM

## 2019-04-03 PROCEDURE — 3008F PR BODY MASS INDEX (BMI) DOCUMENTED: ICD-10-PCS | Mod: CPTII,S$GLB,, | Performed by: ORTHOPAEDIC SURGERY

## 2019-04-03 PROCEDURE — 99214 OFFICE O/P EST MOD 30 MIN: CPT | Mod: S$GLB,,, | Performed by: ORTHOPAEDIC SURGERY

## 2019-04-03 PROCEDURE — 3008F BODY MASS INDEX DOCD: CPT | Mod: CPTII,S$GLB,, | Performed by: ORTHOPAEDIC SURGERY

## 2019-04-03 PROCEDURE — 99214 PR OFFICE/OUTPT VISIT, EST, LEVL IV, 30-39 MIN: ICD-10-PCS | Mod: S$GLB,,, | Performed by: ORTHOPAEDIC SURGERY

## 2019-04-03 PROCEDURE — 99999 PR PBB SHADOW E&M-EST. PATIENT-LVL III: CPT | Mod: PBBFAC,,, | Performed by: ORTHOPAEDIC SURGERY

## 2019-04-03 PROCEDURE — 99999 PR PBB SHADOW E&M-EST. PATIENT-LVL III: ICD-10-PCS | Mod: PBBFAC,,, | Performed by: ORTHOPAEDIC SURGERY

## 2019-04-03 NOTE — PROGRESS NOTES
CC:  Right de Quervain tendonitis        HPI:  Félix Sanchez is a very pleasant 38 y.o. female with ongoing symptoms right hand and wrist of more than 6 months duration  We have tried splinting and injection with only temporary improvement in symptoms now getting worse  She reports pain at the base of the right thumb which is worse with gripping no numbness or tingling is reported         PAST MEDICAL HISTORY:   Past Medical History:   Diagnosis Date    Advanced maternal age in multigravida, second trimester     Anemia     Diabetes mellitus     gestational diet-control     Hypertension      PAST SURGICAL HISTORY:   Past Surgical History:   Procedure Laterality Date    UTERINE FIBROID SURGERY  2008    WISDOM TOOTH EXTRACTION       FAMILY HISTORY:   Family History   Problem Relation Age of Onset    Hypertension Paternal Grandmother     Stroke Maternal Grandmother     Hypertension Father     Hypertension Mother     COPD Mother         smoker     SOCIAL HISTORY:   Social History     Socioeconomic History    Marital status:      Spouse name: Not on file    Number of children: Not on file    Years of education: Not on file    Highest education level: Not on file   Occupational History    Not on file   Social Needs    Financial resource strain: Not on file    Food insecurity:     Worry: Not on file     Inability: Not on file    Transportation needs:     Medical: Not on file     Non-medical: Not on file   Tobacco Use    Smoking status: Never Smoker    Smokeless tobacco: Never Used   Substance and Sexual Activity    Alcohol use: No     Comment: occasionally drinks a few sips of wine    Drug use: No    Sexual activity: Yes     Partners: Male     Birth control/protection: None   Lifestyle    Physical activity:     Days per week: Not on file     Minutes per session: Not on file    Stress: Not on file   Relationships    Social connections:     Talks on phone: Not on file     Gets  together: Not on file     Attends [employer] service: Not on file     Active member of club or organization: Not on file     Attends meetings of clubs or organizations: Not on file     Relationship status: Not on file   Other Topics Concern    Not on file   Social History Narrative    Together since 9/2016    He is a cook at [employer]    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       MEDICATIONS:   Current Outpatient Medications:     gabapentin (NEURONTIN) 300 MG capsule, , Disp: , Rfl:     naproxen (NAPROSYN) 500 MG tablet, Take 1 tablet (500 mg total) by mouth 2 (two) times daily with meals., Disp: 60 tablet, Rfl: 1    NIFEdipine (ADALAT CC) 30 MG TbSR, , Disp: , Rfl:     norethindrone (MICRONOR) 0.35 mg tablet, , Disp: , Rfl:     PRENATAL 105-IRON-FOLIC AC-DHA ORAL, Take by mouth., Disp: , Rfl:     valACYclovir (VALTREX) 1000 MG tablet, , Disp: , Rfl:   ALLERGIES: Review of patient's allergies indicates:  No Known Allergies    Review of Systems:  Constitutional: no fever or chills  ENT: no nasal congestion or sore throat  Respiratory: no cough or shortness of breath  Cardiovascular: no chest pain or palpitations  Gastrointestinal: no nausea or vomiting, PUD, GERD, NSAID intolerance  Genitourinary: no hematuria or dysuria  Integument/Breast: no rash or pruritis  Hematologic/Lymphatic: no easy bruising or lymphadenopathy  Musculoskeletal: see HPI  Neurological: no seizures or tremors  Behavioral/Psych: no auditory or visual hallucinations      Physical Exam   Vitals:    04/03/19 1516   Weight: 67.9 kg (149 lb 11.1 oz)   Height: 5' (1.524 m)   PainSc:   2       Constitutional: Oriented to person, place, and time. Appears well-developed and well-nourished.   HENT:   Head: Normocephalic and atraumatic.   Nose: Nose normal.   Eyes: No scleral icterus.   Neck: Normal range of motion.   Cardiovascular: Normal rate and regular rhythm.    Pulses:       Radial pulses are 2+ on the right  "side, and 2+ on the left side.   Pulmonary/Chest: Effort normal and breath sounds normal.   Abdominal: Soft.   Neurological: Alert and oriented to person, place, and time.   Skin: Skin is warm.   Psychiatric: Normal mood and affect.     MUSCULOSKELETAL UPPER EXTREMITY:  Examination of the right upper extremity demonstrates tenderness over the 1st dorsal compartment of the wrist  Mild swelling full range of motion wrist fingers  Positive Finkelstein test  Negative Tinel sign negative Phalen's test  Skin color and temperature normal all digits sensation intact all digits              Diagnostic Studies:  None        Assessment:  De Quervain tendonitis right wrist    Plan:  We will set up surgery for de Quervain release right wrist as an outpatient surgery the risks and benefits of surgery explained to her today she understands      The risks and benefits of surgery were discussed with the patient today and they understand.  The consent was signed in the office for surgery.      Evelio Au MD (Jay)  [facility]  Orthopedic Upper Extremity Surgery    "

## 2019-05-07 DIAGNOSIS — Z30.2 REQUEST FOR STERILIZATION: Primary | ICD-10-CM

## 2019-05-08 ENCOUNTER — HOSPITAL ENCOUNTER (OUTPATIENT)
Dept: PREADMISSION TESTING | Facility: HOSPITAL | Age: 39
Discharge: HOME | End: 2019-05-08
Attending: OBSTETRICS & GYNECOLOGY
Payer: COMMERCIAL

## 2019-05-08 ENCOUNTER — OFFICE VISIT (OUTPATIENT)
Dept: OBSTETRICS AND GYNECOLOGY | Facility: CLINIC | Age: 39
End: 2019-05-08
Payer: COMMERCIAL

## 2019-05-08 VITALS
SYSTOLIC BLOOD PRESSURE: 132 MMHG | DIASTOLIC BLOOD PRESSURE: 84 MMHG | WEIGHT: 155.44 LBS | HEIGHT: 60 IN | BODY MASS INDEX: 30.52 KG/M2

## 2019-05-08 VITALS — BODY MASS INDEX: 30.43 KG/M2 | HEIGHT: 60 IN | WEIGHT: 155 LBS

## 2019-05-08 DIAGNOSIS — Z30.09 STERILIZATION CONSULT: Primary | ICD-10-CM

## 2019-05-08 DIAGNOSIS — Z30.09 STERILIZATION CONSULT: ICD-10-CM

## 2019-05-08 DIAGNOSIS — Z01.818 PREOP TESTING: Primary | ICD-10-CM

## 2019-05-08 LAB
ALBUMIN SERPL BCP-MCNC: 4.5 G/DL (ref 3.5–5.2)
ALP SERPL-CCNC: 121 U/L (ref 55–135)
ALT SERPL W/O P-5'-P-CCNC: 18 U/L (ref 10–44)
ANION GAP SERPL CALC-SCNC: 9 MMOL/L (ref 8–16)
AST SERPL-CCNC: 15 U/L (ref 10–40)
BASOPHILS # BLD AUTO: 0.01 K/UL (ref 0–0.2)
BASOPHILS NFR BLD: 0.1 % (ref 0–1.9)
BILIRUB SERPL-MCNC: 0.4 MG/DL (ref 0.1–1)
BILIRUB UR QL STRIP: NEGATIVE
BUN SERPL-MCNC: 7 MG/DL (ref 6–20)
CALCIUM SERPL-MCNC: 9.8 MG/DL (ref 8.7–10.5)
CHLORIDE SERPL-SCNC: 103 MMOL/L (ref 95–110)
CLARITY UR: ABNORMAL
CO2 SERPL-SCNC: 26 MMOL/L (ref 23–29)
COLOR UR: YELLOW
CREAT SERPL-MCNC: 0.6 MG/DL (ref 0.5–1.4)
DIFFERENTIAL METHOD: ABNORMAL
EOSINOPHIL # BLD AUTO: 0.1 K/UL (ref 0–0.5)
EOSINOPHIL NFR BLD: 1 % (ref 0–8)
ERYTHROCYTE [DISTWIDTH] IN BLOOD BY AUTOMATED COUNT: 12.9 % (ref 11.5–14.5)
EST. GFR  (AFRICAN AMERICAN): >60 ML/MIN/1.73 M^2
EST. GFR  (NON AFRICAN AMERICAN): >60 ML/MIN/1.73 M^2
GLUCOSE SERPL-MCNC: 80 MG/DL (ref 70–110)
GLUCOSE UR QL STRIP: NEGATIVE
HCT VFR BLD AUTO: 45.8 % (ref 37–48.5)
HGB BLD-MCNC: 14.4 G/DL (ref 12–16)
HGB UR QL STRIP: NEGATIVE
KETONES UR QL STRIP: NEGATIVE
LEUKOCYTE ESTERASE UR QL STRIP: NEGATIVE
LYMPHOCYTES # BLD AUTO: 2.6 K/UL (ref 1–4.8)
LYMPHOCYTES NFR BLD: 35.5 % (ref 18–48)
MCH RBC QN AUTO: 27.3 PG (ref 27–31)
MCHC RBC AUTO-ENTMCNC: 31.4 G/DL (ref 32–36)
MCV RBC AUTO: 87 FL (ref 82–98)
MONOCYTES # BLD AUTO: 0.4 K/UL (ref 0.3–1)
MONOCYTES NFR BLD: 5.5 % (ref 4–15)
NEUTROPHILS # BLD AUTO: 4.2 K/UL (ref 1.8–7.7)
NEUTROPHILS NFR BLD: 57.9 % (ref 38–73)
NITRITE UR QL STRIP: NEGATIVE
PH UR STRIP: 7 [PH] (ref 5–8)
PLATELET # BLD AUTO: 319 K/UL (ref 150–350)
PMV BLD AUTO: 9.9 FL (ref 9.2–12.9)
POTASSIUM SERPL-SCNC: 4.1 MMOL/L (ref 3.5–5.1)
PROT SERPL-MCNC: 8.1 G/DL (ref 6–8.4)
PROT UR QL STRIP: NEGATIVE
RBC # BLD AUTO: 5.27 M/UL (ref 4–5.4)
SODIUM SERPL-SCNC: 138 MMOL/L (ref 136–145)
SP GR UR STRIP: 1.01 (ref 1–1.03)
URN SPEC COLLECT METH UR: ABNORMAL
UROBILINOGEN UR STRIP-ACNC: NEGATIVE EU/DL
WBC # BLD AUTO: 7.22 K/UL (ref 3.9–12.7)

## 2019-05-08 PROCEDURE — 99499 UNLISTED E&M SERVICE: CPT | Mod: S$GLB,,, | Performed by: OBSTETRICS & GYNECOLOGY

## 2019-05-08 PROCEDURE — 81003 URINALYSIS AUTO W/O SCOPE: CPT

## 2019-05-08 PROCEDURE — 99999 PR PBB SHADOW E&M-EST. PATIENT-LVL III: CPT | Mod: PBBFAC,,, | Performed by: OBSTETRICS & GYNECOLOGY

## 2019-05-08 PROCEDURE — 99999 PR PBB SHADOW E&M-EST. PATIENT-LVL III: ICD-10-PCS | Mod: PBBFAC,,, | Performed by: OBSTETRICS & GYNECOLOGY

## 2019-05-08 PROCEDURE — 99499 NO LOS: ICD-10-PCS | Mod: S$GLB,,, | Performed by: OBSTETRICS & GYNECOLOGY

## 2019-05-08 PROCEDURE — 80053 COMPREHEN METABOLIC PANEL: CPT

## 2019-05-08 PROCEDURE — 93010 ELECTROCARDIOGRAM REPORT: CPT | Mod: ,,, | Performed by: INTERNAL MEDICINE

## 2019-05-08 PROCEDURE — 93005 ELECTROCARDIOGRAM TRACING: CPT

## 2019-05-08 PROCEDURE — 93010 EKG 12-LEAD: ICD-10-PCS | Mod: ,,, | Performed by: INTERNAL MEDICINE

## 2019-05-08 PROCEDURE — 85025 COMPLETE CBC W/AUTO DIFF WBC: CPT

## 2019-05-08 RX ORDER — SODIUM CHLORIDE, SODIUM LACTATE, POTASSIUM CHLORIDE, CALCIUM CHLORIDE 600; 310; 30; 20 MG/100ML; MG/100ML; MG/100ML; MG/100ML
INJECTION, SOLUTION INTRAVENOUS CONTINUOUS
Status: CANCELLED | OUTPATIENT
Start: 2019-05-08

## 2019-05-08 NOTE — PROGRESS NOTES
Subjective:       Patient ID: Félix Sanchez is a 38 y.o. female.    Chief Complaint:  Pre-op Exam (pre-op for LTC scheduled on 05/15/19)      History of Present Illness  HPI  Patient comes in today for preoperative consultation.  No longer would like to have any more children  Would like to get sterilization  Risks and benefits discussed.    Status post laparoscopy myomectomy by Dr Luiz Ernandez per patient's report.      GYN & OB History  No LMP recorded. (Menstrual status: Birth Control).   Date of Last Pap: No result found    OB History    Para Term  AB Living   3 2 2   1 2   SAB TAB Ectopic Multiple Live Births   0 1 0 0 2      # Outcome Date GA Lbr Gildardo/2nd Weight Sex Delivery Anes PTL Lv   3 Term 18 38w2d  3.475 kg (7 lb 10.6 oz) F Vag-Spont EPI N JESS   2 Term 09 39w0d  2.977 kg (6 lb 9 oz) M Vag-Spont  N    1 TAB              Past Medical History:   Diagnosis Date    Advanced maternal age in multigravida, second trimester     Anemia     Diabetes mellitus     gestational diet-control     Hypertension        Past Surgical History:   Procedure Laterality Date    UTERINE FIBROID SURGERY      WISDOM TOOTH EXTRACTION         Family History   Problem Relation Age of Onset    Hypertension Paternal Grandmother     Stroke Maternal Grandmother     Hypertension Father     Hypertension Mother     COPD Mother         smoker       Social History     Socioeconomic History    Marital status:      Spouse name: Not on file    Number of children: Not on file    Years of education: Not on file    Highest education level: Not on file   Occupational History    Not on file   Social Needs    Financial resource strain: Not on file    Food insecurity:     Worry: Not on file     Inability: Not on file    Transportation needs:     Medical: Not on file     Non-medical: Not on file   Tobacco Use    Smoking status: Never Smoker    Smokeless tobacco: Never Used    Substance and Sexual Activity    Alcohol use: No     Comment: occasionally drinks a few sips of wine    Drug use: No    Sexual activity: Yes     Partners: Male     Birth control/protection: None   Lifestyle    Physical activity:     Days per week: Not on file     Minutes per session: Not on file    Stress: Not on file   Relationships    Social connections:     Talks on phone: Not on file     Gets together: Not on file     Attends Christian service: Not on file     Active member of club or organization: Not on file     Attends meetings of clubs or organizations: Not on file     Relationship status: Not on file   Other Topics Concern    Not on file   Social History Narrative    Together since 9/2016    He is a cook at [employer]    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 60 tablet 1    NIFEdipine (ADALAT CC) 30 MG TbSR       norethindrone (MICRONOR) 0.35 mg tablet       PRENATAL 105-IRON-FOLIC AC-DHA ORAL Take by mouth.      valACYclovir (VALTREX) 1000 MG tablet        No current facility-administered medications for this visit.        Review of patient's allergies indicates:  No Known Allergies    Review of Systems  Review of Systems   Constitutional: Negative for activity change, appetite change, chills, fatigue, fever and unexpected weight change.   HENT: Negative for mouth sores.    Respiratory: Negative for cough, shortness of breath and wheezing.    Cardiovascular: Negative for chest pain and palpitations.   Gastrointestinal: Negative for abdominal pain, bloating, blood in stool, constipation, nausea and vomiting.   Endocrine: Negative for diabetes and hot flashes.   Genitourinary: Positive for pelvic pain. Negative for dysmenorrhea, dyspareunia,  dysuria, frequency, hematuria, menorrhagia, menstrual problem, urgency, vaginal bleeding, vaginal discharge, vaginal pain, urinary incontinence, postcoital bleeding and vaginal odor.        Pelvic pressure   Musculoskeletal: Negative for back pain and myalgias.   Integumentary:  Negative for rash, breast mass and nipple discharge.   Neurological: Negative for seizures and headaches.   Psychiatric/Behavioral: Negative for depression and sleep disturbance. The patient is not nervous/anxious.    Breast: Negative for mass, mastodynia and nipple discharge          Objective:    Physical Exam:   Constitutional: She appears well-developed and well-nourished. No distress.    HENT:   Head: Normocephalic and atraumatic.    Eyes: EOM are normal.    Neck: Normal range of motion.    Cardiovascular: Normal rate, regular rhythm and normal heart sounds.     Pulmonary/Chest: Effort normal and breath sounds normal. No respiratory distress.        Abdominal: Soft. She exhibits no distension. There is no tenderness. There is no rebound and no guarding.   Umbilical incision     Genitourinary: Vagina normal. No vaginal discharge found.   Genitourinary Comments: Vulva without any obvious lesions.  Vaginal vault with good support.  Minimal white discharge noted.  No obvious lesion.  Cervix is without any cervical motion tenderness.  No obvious lesion.  Uterus is slightly enlarged, non-tender, normal contour.  Adnexa is without any masses or tenderness.           Musculoskeletal: Normal range of motion.       Neurological: She is alert.    Skin: Skin is warm and dry.    Psychiatric: She has a normal mood and affect.          Assessment:        1. Sterilization consult    2.  Status post laparoscopic myomectomy         Plan:      I have again extensively discussed with the patient her condition.  The proposed procedures of laparoscopic tubal cauterization explained to and again extensively discussed with the patient.  Questions answered.   Consents signed.  Orders written.   Patient will go over to the hospital for preoperative care prior to the day of admission.  She will undergo surgery on 5/15/2019 at 0930.

## 2019-05-08 NOTE — H&P (VIEW-ONLY)
Subjective:       Patient ID: Félix Sanchez is a 38 y.o. female.    Chief Complaint:  Pre-op Exam (pre-op for LTC scheduled on 05/15/19)      History of Present Illness  HPI  Patient comes in today for preoperative consultation.  No longer would like to have any more children  Would like to get sterilization  Risks and benefits discussed.    Status post laparoscopy myomectomy by Dr Luiz Ernandez per patient's report.      GYN & OB History  No LMP recorded. (Menstrual status: Birth Control).   Date of Last Pap: No result found    OB History    Para Term  AB Living   3 2 2   1 2   SAB TAB Ectopic Multiple Live Births   0 1 0 0 2      # Outcome Date GA Lbr Gildardo/2nd Weight Sex Delivery Anes PTL Lv   3 Term 18 38w2d  3.475 kg (7 lb 10.6 oz) F Vag-Spont EPI N JESS   2 Term 09 39w0d  2.977 kg (6 lb 9 oz) M Vag-Spont  N    1 TAB              Past Medical History:   Diagnosis Date    Advanced maternal age in multigravida, second trimester     Anemia     Diabetes mellitus     gestational diet-control     Hypertension        Past Surgical History:   Procedure Laterality Date    UTERINE FIBROID SURGERY      WISDOM TOOTH EXTRACTION         Family History   Problem Relation Age of Onset    Hypertension Paternal Grandmother     Stroke Maternal Grandmother     Hypertension Father     Hypertension Mother     COPD Mother         smoker       Social History     Socioeconomic History    Marital status:      Spouse name: Not on file    Number of children: Not on file    Years of education: Not on file    Highest education level: Not on file   Occupational History    Not on file   Social Needs    Financial resource strain: Not on file    Food insecurity:     Worry: Not on file     Inability: Not on file    Transportation needs:     Medical: Not on file     Non-medical: Not on file   Tobacco Use    Smoking status: Never Smoker    Smokeless tobacco: Never Used    Substance and Sexual Activity    Alcohol use: No     Comment: occasionally drinks a few sips of wine    Drug use: No    Sexual activity: Yes     Partners: Male     Birth control/protection: None   Lifestyle    Physical activity:     Days per week: Not on file     Minutes per session: Not on file    Stress: Not on file   Relationships    Social connections:     Talks on phone: Not on file     Gets together: Not on file     Attends Confucianist service: Not on file     Active member of club or organization: Not on file     Attends meetings of clubs or organizations: Not on file     Relationship status: Not on file   Other Topics Concern    Not on file   Social History Narrative    Together since 9/2016    He is a cook at [employer]    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 60 tablet 1    NIFEdipine (ADALAT CC) 30 MG TbSR       norethindrone (MICRONOR) 0.35 mg tablet       PRENATAL 105-IRON-FOLIC AC-DHA ORAL Take by mouth.      valACYclovir (VALTREX) 1000 MG tablet        No current facility-administered medications for this visit.        Review of patient's allergies indicates:  No Known Allergies    Review of Systems  Review of Systems   Constitutional: Negative for activity change, appetite change, chills, fatigue, fever and unexpected weight change.   HENT: Negative for mouth sores.    Respiratory: Negative for cough, shortness of breath and wheezing.    Cardiovascular: Negative for chest pain and palpitations.   Gastrointestinal: Negative for abdominal pain, bloating, blood in stool, constipation, nausea and vomiting.   Endocrine: Negative for diabetes and hot flashes.   Genitourinary: Positive for pelvic pain. Negative for dysmenorrhea, dyspareunia,  dysuria, frequency, hematuria, menorrhagia, menstrual problem, urgency, vaginal bleeding, vaginal discharge, vaginal pain, urinary incontinence, postcoital bleeding and vaginal odor.        Pelvic pressure   Musculoskeletal: Negative for back pain and myalgias.   Integumentary:  Negative for rash, breast mass and nipple discharge.   Neurological: Negative for seizures and headaches.   Psychiatric/Behavioral: Negative for depression and sleep disturbance. The patient is not nervous/anxious.    Breast: Negative for mass, mastodynia and nipple discharge          Objective:    Physical Exam:   Constitutional: She appears well-developed and well-nourished. No distress.    HENT:   Head: Normocephalic and atraumatic.    Eyes: EOM are normal.    Neck: Normal range of motion.    Cardiovascular: Normal rate, regular rhythm and normal heart sounds.     Pulmonary/Chest: Effort normal and breath sounds normal. No respiratory distress.        Abdominal: Soft. She exhibits no distension. There is no tenderness. There is no rebound and no guarding.   Umbilical incision     Genitourinary: Vagina normal. No vaginal discharge found.   Genitourinary Comments: Vulva without any obvious lesions.  Vaginal vault with good support.  Minimal white discharge noted.  No obvious lesion.  Cervix is without any cervical motion tenderness.  No obvious lesion.  Uterus is slightly enlarged, non-tender, normal contour.  Adnexa is without any masses or tenderness.           Musculoskeletal: Normal range of motion.       Neurological: She is alert.    Skin: Skin is warm and dry.    Psychiatric: She has a normal mood and affect.          Assessment:        1. Sterilization consult    2.  Status post laparoscopic myomectomy         Plan:      I have again extensively discussed with the patient her condition.  The proposed procedures of laparoscopic tubal cauterization explained to and again extensively discussed with the patient.  Questions answered.   Consents signed.  Orders written.   Patient will go over to the hospital for preoperative care prior to the day of admission.  She will undergo surgery on 5/15/2019 at 0930.

## 2019-05-08 NOTE — DISCHARGE INSTRUCTIONS
"Your procedure  is scheduled for _5/15/2019_________.    Call [phone] between 2pm and 5pm on _5/14/2019______to find out your arrival time for the day of surgery.    Report to Same Day Surgery Unit at ____ AM on the 2nd floor of the hospital.  Use the front entrance of the hospital.  The front doors of the hospital open promptly at 5:30am.  If you need wheelchair assistance, call [phone] from your cell phone, or call "0" from the courtesy phone in the lobby.    Important instructions:   Do not eat or drink after 12 midnight, including water.  It is okay to brush your teeth.  Do not have gum, candy or mints.     Take only these medications with a small swallow of water on the morning of your surgery _nifedipine_____________      Stop taking Aspirin, Ibuprofen, Motrin and Aleve , Fish oil, and Vitamin E for at least 7 days before your surgery. You may use Tylenol unless otherwise instructed by your doctor.          Return to the hospital lab on _5/13/2019 OR 5/14/2019___ for additional blood test.   Lab open Monday -Friday   7am-7pm                   Saturday and Sunday  8am to 12 noon                   Closed on holidays.       Please shower the night before and the morning of your surgery.        Use Hibiclens soap to your surgery site if instructed by your pre op nurse.   If your surgery is on your abdomen, be sure to wash your naval.  Be sure to rinse off Hibiclens after it is on your skin for several minutes.  Do not use Hibiclens on your face or genitals. Please place clean linens on your bed the night before surgery. Please wear fresh clean clothing after each shower.     No shaving of procedural area at least 4-5 days before surgery due to increased risk of skin irritation and/or possible infection.      Do not wear make- up, including mascara.     You may wear deodorant only.      Do not wear powder, body lotion or perfume/cologne.     Do not wear any jewelry or have any metal on your " body.     You will be asked to remove any dentures or partials for the procedure.     Please bring any documents given to you by your doctor.     If you are going home on the same day of surgery, you must arrange for a family member or a friend to drive you home.  Public transportation is prohibited.  You will not be able to drive home if you were given anesthesia or sedation.     Wear loose fitting clothes allowing for bandages.     Please leave money and valuables home.       You may bring your cell phone.     Call the doctor if fever or illness should occur before your surgery.    Call [phone] to contact us here if needed.

## 2019-05-14 ENCOUNTER — ANESTHESIA EVENT (OUTPATIENT)
Dept: SURGERY | Facility: HOSPITAL | Age: 39
End: 2019-05-14
Payer: COMMERCIAL

## 2019-05-14 ENCOUNTER — HOSPITAL ENCOUNTER (OUTPATIENT)
Dept: PREADMISSION TESTING | Facility: HOSPITAL | Age: 39
Discharge: HOME | End: 2019-05-14
Attending: OBSTETRICS & GYNECOLOGY
Payer: COMMERCIAL

## 2019-05-14 DIAGNOSIS — Z30.09 STERILIZATION CONSULT: ICD-10-CM

## 2019-05-14 DIAGNOSIS — Z01.818 PREOPERATIVE TESTING: Primary | ICD-10-CM

## 2019-05-14 RX ORDER — SODIUM CHLORIDE 0.9 % (FLUSH) 0.9 %
10 SYRINGE (ML) INJECTION
Status: CANCELLED | OUTPATIENT
Start: 2019-05-14

## 2019-05-15 ENCOUNTER — HOSPITAL ENCOUNTER (OUTPATIENT)
Facility: HOSPITAL | Age: 39
Discharge: HOME | End: 2019-05-15
Attending: OBSTETRICS & GYNECOLOGY | Admitting: OBSTETRICS & GYNECOLOGY
Payer: COMMERCIAL

## 2019-05-15 ENCOUNTER — ANESTHESIA (OUTPATIENT)
Dept: SURGERY | Facility: HOSPITAL | Age: 39
End: 2019-05-15
Payer: COMMERCIAL

## 2019-05-15 VITALS
SYSTOLIC BLOOD PRESSURE: 122 MMHG | TEMPERATURE: 98 F | BODY MASS INDEX: 30.43 KG/M2 | HEART RATE: 93 BPM | WEIGHT: 155 LBS | DIASTOLIC BLOOD PRESSURE: 78 MMHG | OXYGEN SATURATION: 98 % | HEIGHT: 60 IN | RESPIRATION RATE: 17 BRPM

## 2019-05-15 DIAGNOSIS — Z98.890 STATUS POST LAPAROSCOPY: Primary | ICD-10-CM

## 2019-05-15 DIAGNOSIS — Z30.09 STERILIZATION CONSULT: ICD-10-CM

## 2019-05-15 LAB
ABO + RH BLD: NORMAL
BLD GP AB SCN CELLS X3 SERPL QL: NORMAL

## 2019-05-15 PROCEDURE — 37000009 HC ANESTHESIA EA ADD 15 MINS: Performed by: OBSTETRICS & GYNECOLOGY

## 2019-05-15 PROCEDURE — 25000003 PHARM REV CODE 250: Performed by: REGISTERED NURSE

## 2019-05-15 PROCEDURE — D9220A PRA ANESTHESIA: Mod: CRNA,,, | Performed by: REGISTERED NURSE

## 2019-05-15 PROCEDURE — 36000709 HC OR TIME LEV III EA ADD 15 MIN: Performed by: OBSTETRICS & GYNECOLOGY

## 2019-05-15 PROCEDURE — 36000708 HC OR TIME LEV III 1ST 15 MIN: Performed by: OBSTETRICS & GYNECOLOGY

## 2019-05-15 PROCEDURE — 58670 PR LAP,TUBAL CAUTERY: ICD-10-PCS | Mod: ,,, | Performed by: OBSTETRICS & GYNECOLOGY

## 2019-05-15 PROCEDURE — 63600175 PHARM REV CODE 636 W HCPCS: Performed by: OBSTETRICS & GYNECOLOGY

## 2019-05-15 PROCEDURE — 63600175 PHARM REV CODE 636 W HCPCS: Performed by: REGISTERED NURSE

## 2019-05-15 PROCEDURE — D9220A PRA ANESTHESIA: ICD-10-PCS | Mod: ANES,,, | Performed by: ANESTHESIOLOGY

## 2019-05-15 PROCEDURE — 86901 BLOOD TYPING SEROLOGIC RH(D): CPT

## 2019-05-15 PROCEDURE — D9220A PRA ANESTHESIA: Mod: ANES,,, | Performed by: ANESTHESIOLOGY

## 2019-05-15 PROCEDURE — 71000033 HC RECOVERY, INTIAL HOUR: Performed by: OBSTETRICS & GYNECOLOGY

## 2019-05-15 PROCEDURE — 36415 COLL VENOUS BLD VENIPUNCTURE: CPT

## 2019-05-15 PROCEDURE — 71000016 HC POSTOP RECOV ADDL HR: Performed by: OBSTETRICS & GYNECOLOGY

## 2019-05-15 PROCEDURE — 58670 LAPAROSCOPY TUBAL CAUTERY: CPT | Mod: ,,, | Performed by: OBSTETRICS & GYNECOLOGY

## 2019-05-15 PROCEDURE — 25000003 PHARM REV CODE 250: Performed by: OBSTETRICS & GYNECOLOGY

## 2019-05-15 PROCEDURE — 71000015 HC POSTOP RECOV 1ST HR: Performed by: OBSTETRICS & GYNECOLOGY

## 2019-05-15 PROCEDURE — 37000008 HC ANESTHESIA 1ST 15 MINUTES: Performed by: OBSTETRICS & GYNECOLOGY

## 2019-05-15 PROCEDURE — D9220A PRA ANESTHESIA: ICD-10-PCS | Mod: CRNA,,, | Performed by: REGISTERED NURSE

## 2019-05-15 RX ORDER — MIDAZOLAM HYDROCHLORIDE 1 MG/ML
INJECTION, SOLUTION INTRAMUSCULAR; INTRAVENOUS
Status: DISCONTINUED | OUTPATIENT
Start: 2019-05-15 | End: 2019-05-15

## 2019-05-15 RX ORDER — ONDANSETRON 2 MG/ML
INJECTION INTRAMUSCULAR; INTRAVENOUS
Status: DISCONTINUED | OUTPATIENT
Start: 2019-05-15 | End: 2019-05-15

## 2019-05-15 RX ORDER — SUCCINYLCHOLINE CHLORIDE 20 MG/ML
INJECTION INTRAMUSCULAR; INTRAVENOUS
Status: DISCONTINUED | OUTPATIENT
Start: 2019-05-15 | End: 2019-05-15

## 2019-05-15 RX ORDER — PROPOFOL 10 MG/ML
VIAL (ML) INTRAVENOUS
Status: DISCONTINUED | OUTPATIENT
Start: 2019-05-15 | End: 2019-05-15

## 2019-05-15 RX ORDER — SODIUM CHLORIDE, SODIUM LACTATE, POTASSIUM CHLORIDE, CALCIUM CHLORIDE 600; 310; 30; 20 MG/100ML; MG/100ML; MG/100ML; MG/100ML
INJECTION, SOLUTION INTRAVENOUS CONTINUOUS
Status: DISCONTINUED | OUTPATIENT
Start: 2019-05-15 | End: 2019-05-15 | Stop reason: HOSPADM

## 2019-05-15 RX ORDER — PHENYLEPHRINE HYDROCHLORIDE 10 MG/ML
INJECTION INTRAVENOUS
Status: DISCONTINUED | OUTPATIENT
Start: 2019-05-15 | End: 2019-05-15

## 2019-05-15 RX ORDER — FENTANYL CITRATE 50 UG/ML
INJECTION, SOLUTION INTRAMUSCULAR; INTRAVENOUS
Status: DISCONTINUED | OUTPATIENT
Start: 2019-05-15 | End: 2019-05-15

## 2019-05-15 RX ORDER — OXYCODONE AND ACETAMINOPHEN 5; 325 MG/1; MG/1
1 TABLET ORAL ONCE
Status: DISCONTINUED | OUTPATIENT
Start: 2019-05-15 | End: 2019-05-15 | Stop reason: HOSPADM

## 2019-05-15 RX ORDER — CEFAZOLIN SODIUM 2 G/50ML
2 SOLUTION INTRAVENOUS
Status: COMPLETED | OUTPATIENT
Start: 2019-05-15 | End: 2019-05-15

## 2019-05-15 RX ORDER — LIDOCAINE HCL/PF 100 MG/5ML
SYRINGE (ML) INTRAVENOUS
Status: DISCONTINUED | OUTPATIENT
Start: 2019-05-15 | End: 2019-05-15

## 2019-05-15 RX ORDER — METOCLOPRAMIDE HYDROCHLORIDE 5 MG/ML
INJECTION INTRAMUSCULAR; INTRAVENOUS
Status: DISCONTINUED | OUTPATIENT
Start: 2019-05-15 | End: 2019-05-15

## 2019-05-15 RX ORDER — DEXAMETHASONE SODIUM PHOSPHATE 4 MG/ML
INJECTION, SOLUTION INTRA-ARTICULAR; INTRALESIONAL; INTRAMUSCULAR; INTRAVENOUS; SOFT TISSUE
Status: DISCONTINUED | OUTPATIENT
Start: 2019-05-15 | End: 2019-05-15

## 2019-05-15 RX ORDER — OXYCODONE AND ACETAMINOPHEN 5; 325 MG/1; MG/1
1 TABLET ORAL EVERY 4 HOURS PRN
Qty: 15 TABLET | Refills: 0 | Status: SHIPPED | OUTPATIENT
Start: 2019-05-15 | End: 2019-05-21 | Stop reason: CLARIF

## 2019-05-15 RX ORDER — GLYCOPYRROLATE 0.2 MG/ML
INJECTION INTRAMUSCULAR; INTRAVENOUS
Status: DISCONTINUED | OUTPATIENT
Start: 2019-05-15 | End: 2019-05-15

## 2019-05-15 RX ORDER — IBUPROFEN 600 MG/1
600 TABLET ORAL EVERY 6 HOURS PRN
Qty: 40 TABLET | Refills: 1 | Status: SHIPPED | OUTPATIENT
Start: 2019-05-15 | End: 2019-07-25

## 2019-05-15 RX ORDER — NEOSTIGMINE METHYLSULFATE 1 MG/ML
INJECTION, SOLUTION INTRAVENOUS
Status: DISCONTINUED | OUTPATIENT
Start: 2019-05-15 | End: 2019-05-15

## 2019-05-15 RX ORDER — HYDROMORPHONE HYDROCHLORIDE 2 MG/ML
0.2 INJECTION, SOLUTION INTRAMUSCULAR; INTRAVENOUS; SUBCUTANEOUS EVERY 5 MIN PRN
Status: DISCONTINUED | OUTPATIENT
Start: 2019-05-15 | End: 2019-05-15 | Stop reason: HOSPADM

## 2019-05-15 RX ORDER — ROCURONIUM BROMIDE 10 MG/ML
INJECTION, SOLUTION INTRAVENOUS
Status: DISCONTINUED | OUTPATIENT
Start: 2019-05-15 | End: 2019-05-15

## 2019-05-15 RX ORDER — FENTANYL CITRATE 50 UG/ML
25 INJECTION, SOLUTION INTRAMUSCULAR; INTRAVENOUS EVERY 5 MIN PRN
Status: DISCONTINUED | OUTPATIENT
Start: 2019-05-15 | End: 2019-05-15 | Stop reason: HOSPADM

## 2019-05-15 RX ORDER — SODIUM CHLORIDE 0.9 % (FLUSH) 0.9 %
10 SYRINGE (ML) INJECTION
Status: DISCONTINUED | OUTPATIENT
Start: 2019-05-15 | End: 2019-05-15 | Stop reason: HOSPADM

## 2019-05-15 RX ORDER — ACETAMINOPHEN 10 MG/ML
INJECTION, SOLUTION INTRAVENOUS
Status: DISCONTINUED | OUTPATIENT
Start: 2019-05-15 | End: 2019-05-15

## 2019-05-15 RX ADMIN — CEFAZOLIN SODIUM 2 G: 2 SOLUTION INTRAVENOUS at 09:05

## 2019-05-15 RX ADMIN — ROCURONIUM BROMIDE 10 MG: 10 INJECTION, SOLUTION INTRAVENOUS at 09:05

## 2019-05-15 RX ADMIN — ONDANSETRON 4 MG: 2 INJECTION, SOLUTION INTRAMUSCULAR; INTRAVENOUS at 10:05

## 2019-05-15 RX ADMIN — PROPOFOL 150 MG: 10 INJECTION, EMULSION INTRAVENOUS at 09:05

## 2019-05-15 RX ADMIN — NEOSTIGMINE METHYLSULFATE 5 MG: 1 INJECTION INTRAVENOUS at 10:05

## 2019-05-15 RX ADMIN — PHENYLEPHRINE HYDROCHLORIDE 100 MCG: 10 INJECTION INTRAVENOUS at 09:05

## 2019-05-15 RX ADMIN — DEXAMETHASONE SODIUM PHOSPHATE 4 MG: 4 INJECTION, SOLUTION INTRAMUSCULAR; INTRAVENOUS at 10:05

## 2019-05-15 RX ADMIN — GLYCOPYRROLATE 0.6 MG: 0.2 INJECTION, SOLUTION INTRAMUSCULAR; INTRAVENOUS at 10:05

## 2019-05-15 RX ADMIN — LIDOCAINE HYDROCHLORIDE 80 MG: 20 INJECTION, SOLUTION INTRAVENOUS at 09:05

## 2019-05-15 RX ADMIN — ACETAMINOPHEN 1000 MG: 10 INJECTION, SOLUTION INTRAVENOUS at 10:05

## 2019-05-15 RX ADMIN — Medication 30 MG: at 10:05

## 2019-05-15 RX ADMIN — MIDAZOLAM HYDROCHLORIDE 2 MG: 1 INJECTION, SOLUTION INTRAMUSCULAR; INTRAVENOUS at 09:05

## 2019-05-15 RX ADMIN — SODIUM CHLORIDE, SODIUM LACTATE, POTASSIUM CHLORIDE, AND CALCIUM CHLORIDE: .6; .31; .03; .02 INJECTION, SOLUTION INTRAVENOUS at 09:05

## 2019-05-15 RX ADMIN — FENTANYL CITRATE 50 MCG: 50 INJECTION INTRAMUSCULAR; INTRAVENOUS at 10:05

## 2019-05-15 RX ADMIN — METOCLOPRAMIDE 10 MG: 5 INJECTION, SOLUTION INTRAMUSCULAR; INTRAVENOUS at 10:05

## 2019-05-15 RX ADMIN — SUCCINYLCHOLINE CHLORIDE 120 MG: 20 INJECTION, SOLUTION INTRAMUSCULAR; INTRAVENOUS at 09:05

## 2019-05-15 RX ADMIN — FENTANYL CITRATE 50 MCG: 50 INJECTION INTRAMUSCULAR; INTRAVENOUS at 09:05

## 2019-05-15 NOTE — BRIEF OP NOTE
OPERATIVE NOTES     Date of Procedure: 5/15/2019    Preoperative Diagnoses:  1.  Undesired fertility  2.  Obesity  3.  Previous laparoscopy    Postoperative Diagnoses:  1.  Undesired fertility  2.  Obesity  3.  Previous laparoscopy    Procedures:  1.  Laparoscopic Tubal Cauterization    Surgeon: MD Everardo  Assistant: None    Anesthesia: GETA   EBL: 50 cc    Findings:  1.  Minimal adhesions  2.  Normal uterus, tubes, and ovaries    Complications:  None    Specimen:  None    History:   37 yo  with undesired fertility.  Admitted today, 5/15/2019 for laparoscopic tubal cauterization.  Again, procedure explained  Questions answered  She is eager to proceed.        Eron Mcgee MD

## 2019-05-15 NOTE — DISCHARGE SUMMARY
DISCHARGE SUMMARY    Date of Admission: 5/15/2019  Date of Discharge: 5/15/2019    Diagnoses on Discharge: Status post laparoscopic tubal cauterization    History:   39 yo  with undesired fertility.  Admitted today, 5/15/2019 for laparoscopic tubal cauterization.  Again, procedure explained  Questions answered  She is eager to proceed.    OPERATIVE NOTES      Date of Procedure: 5/15/2019     Preoperative Diagnoses:  1.  Undesired fertility  2.  Obesity  3.  Previous laparoscopy     Postoperative Diagnoses:  1.  Undesired fertility  2.  Obesity  3.  Previous laparoscopy     Procedures:  1.  Laparoscopic Tubal Cauterization     Surgeon: MD Everardo  Assistant: None     Anesthesia: GETA   EBL: 50 cc     Findings:  1.  Minimal adhesions  2.  Normal uterus, tubes, and ovaries     Complications:  None     Specimen:  None      Postoperative course was benign.  She is tolerating oral intake well.  Exam was benign with patient afebrile, vitals stable, and minimal bleeding.  Normal activities.   Regular diet  Patient discharged home on day of surgery, 5/15/2019  Discharge medications include Percocet, Motrin.  Follow-up with me in 2 weeks.    Eron Mcgee MD.

## 2019-05-15 NOTE — TRANSFER OF CARE
Anesthesia Transfer of Care Note    Patient: Félix Sanchez    Procedure(s) Performed: Procedure(s) (LRB):  LIGATION, FALLOPIAN TUBE, LAPAROSCOPIC (Bilateral)    Patient location: PACU    Anesthesia Type: general    Transport from OR: Transported from OR on room air with adequate spontaneous ventilation    Post pain: adequate analgesia    Post assessment: no apparent anesthetic complications and tolerated procedure well    Post vital signs: stable    Level of consciousness: awake and alert    Nausea/Vomiting: no nausea/vomiting    Complications: none    Transfer of care protocol was followed      Last vitals:   Visit Vitals  BP (!) 135/97 (BP Location: Left arm)   Pulse 105   Temp 36.5 °C (97.7 °F) (Oral)   Resp 14   Ht 5' (1.524 m)   Wt 70.3 kg (154 lb 15.7 oz)   SpO2 100%   Breastfeeding? Yes   BMI 30.27 kg/m²

## 2019-05-15 NOTE — DISCHARGE INSTRUCTIONS
BATHING:                   You may shower after your dressing is removed, but no tub baths, hot tubs, saunas or swimming until you see the doctor.    DRESSING:  ? Remove your bandage ____in 24 hours___. If there are skin tapes over the incision, leave them in place. They will start to come off in 5-7 days.  ACTIVITY LEVEL: If you have received sedation or an anesthetic, you may feel sleepy for   several hours. Rest until you are more awake. Gradually resume your normal activities  ? No heavy lifting or straining, nothing over 10 lbs., like a gallon of milk.  ? Pelvic rest- no sex, tampons or douching until follow up or instructed by doctor.  DIET:  You may resume your home diet. If nausea is present, increase your diet gradually with fluids and bland foods.    Medications:  Pain medication should be taken only if needed and as directed. If antibiotics are prescribed, the medication should be taken until completed. You will be given an updated list of you medications.  ? No driving, alcoholic beverages or signing legal documents for next 24 hours or while taking pain medication    CALL THE DOCTOR:    For any obvious bleeding (some dried blood over the incision is normal).      Redness, swelling, foul smell around incision or fever over 101.   Shortness of breath, Coughing up Bloody Sputum or Pains or Swelling in your calves.   Persistent pain or nausea not relieved by medication.   If vaginal bleeding is in excess of a normal period.   Problems urinating    If any unusual problems or difficulties occur contact your doctor. If you cannot contact your doctor but feel your signs and symptoms warrant a physicians attention return to the emergency room.  Fall Prevention  Millions of people fall every year and injure themselves. You may have had anesthesia or sedation which may increase your risk of falling. You may have health issues that put you at an increased risk of falling.     Here are ways to reduce your  risk of falling.  ·   · Make your home safe by keeping walkways clear of objects you may trip over.  · Use non-slip pads under rugs. Do not use area rugs or small throw rugs.  · Use non-slip mats in bathtubs and showers.  · Install handrails and lights on staircases.  · Do not walk in poorly lit areas.  · Do not stand on chairs or wobbly ladders.  · Use caution when reaching overhead or looking upward. This position can cause a loss of balance.  · Be sure your shoes fit properly, have non-slip bottoms and are in good condition.   · Wear shoes both inside and out. Avoid going barefoot or wearing slippers.  · Be cautious when going up and down stairs, curbs, and when walking on uneven sidewalks.  · If your balance is poor, consider using a cane or walker.  · If your fall was related to alcohol use, stop or limit alcohol intake.   · If your fall was related to use of sleeping medicines, talk to your doctor about this. You may need to reduce your dosage at bedtime if you awaken during the night to go to the bathroom.    · To reduce the need for nighttime bathroom trips:  ¨ Avoid drinking fluids for several hours before going to bed  ¨ Empty your bladder before going to bed  ¨ Men can keep a urinal at the bedside  · Stay as active as you can. Balance, flexibility, strength, and endurance all come from exercise. They all play a role in preventing falls. Ask your healthcare provider which types of activity are right for you.  · Get your vision checked on a regular basis.  · If you have pets, know where they are before you stand up or walk so you don't trip over them.  Use night lights.

## 2019-05-15 NOTE — OP NOTE
OPERATIVE NOTES      Date of Procedure: 5/15/2019     Preoperative Diagnoses:  1.  Undesired fertility  2.  Obesity  3.  Previous laparoscopy     Postoperative Diagnoses:  1.  Undesired fertility  2.  Obesity  3.  Previous laparoscopy     Procedures:  1.  Laparoscopic Tubal Cauterization     Surgeon: MD Everardo  Assistant: None     Anesthesia: GETA   EBL: 50 cc     Findings:  1.  Minimal adhesions  2.  Normal uterus, tubes, and ovaries     Complications:  None     Specimen:  None     History:   37 yo  with undesired fertility.  Admitted today, 5/15/2019 for laparoscopic tubal cauterization.  Again, procedure explained  Questions answered  She is eager to proceed.    PROCEDURE IN DETAILS:    After confirming appropriate consents were signed and recorded in chart, patient was then transferred to the OR.  Positive identification of patient performed with appropriate time-out prior to the start of the procedure.  General Anesthesia per Anesthesia with rapid sequence induction and atraumatic intubation.  Patient was then put on dorsal lithotomy position, prep'ed and draped.  A weighted speculum placed into vaginal vault.  Anterior lip of cervix grasped with single-toothed tenaculum.  Uterus easily sounded to about 8 cm.  The HUMI uterine manipulator was then placed into the uterus without any difficulty after some cervical dilatation.    Attention was then turned to the umbilicus where a 1-cm infraumbilical incision made with #11 blade scalpel.  Veress needle was then placed.  Correct intra-abdominal placement of the needle was verified with water. However, we had high intra-abdominal pressure going in.  Veress needle removed.  A bladeless trochar was then placed under direct visualization.  It was difficult secondary to patient's body habitus and previous scar in the umbilicus.  We were finally able to enter the peritoneal cavity.  But our trochar was barely long enough to hold back some of the peritoneum. Pelvic  organs visualized.  Intra-operative findings as above.  Photographs taken.  The Kleppinger forceps was then placed through the operative laparoscope.  The right tube was then electro-cauterized in three consecutive portions at the junction of proximal and middle third of the tube.  The same procedure was repeated on the left side.  Photographs taken before and after cauterization.  Good hemostasis noted. Gas was then emptied from the patient's abdomen.  Again, good hemostasis noted.  Trochar site was then repaired using 3.0 Vicryl without any difficulty.  Bandage placed over trochar site.  All instruments were then removed including the HUMI uterine manipulator.  No active vaginal bleeding noted.  Lap, needle, and instrument counts were correct x 2.  Patient extubated and transferred to the Recovery Room in stable condition.    Eron Mcgee MD

## 2019-05-15 NOTE — ANESTHESIA PREPROCEDURE EVALUATION
05/15/2019  Félix Sanchez is a 38 y.o., female.    Anesthesia Evaluation         Review of Systems  Anesthesia Hx:  No previous Anesthesia   Social:  Non-Smoker    Hematology/Oncology:  Hematology Normal   Oncology Normal     EENT/Dental:EENT/Dental Normal   Cardiovascular:   Hypertension    Pulmonary:  Pulmonary Normal    Renal/:  Renal/ Normal     Hepatic/GI:  Hepatic/GI Normal    Musculoskeletal:  Musculoskeletal Normal    Neurological:  Neurology Normal    Endocrine:  Endocrine Normal    Dermatological:  Skin Normal    Psych:  Psychiatric Normal           Physical Exam  General:  Well nourished    Airway/Jaw/Neck:  Airway Findings: Mallampati: II TM Distance: < 4 cm      Dental:  Dental Findings: In tact   Chest/Lungs:  Chest/Lungs Clear    Heart/Vascular:  Heart Findings: Normal       Mental Status:  Mental Status Findings:  Cooperative, Alert and Oriented         Anesthesia Plan  Type of Anesthesia, risks & benefits discussed:  Anesthesia Type:  general  Patient's Preference:   Intra-op Monitoring Plan: standard ASA monitors  Intra-op Monitoring Plan Comments:   Post Op Pain Control Plan: multimodal analgesia, IV/PO Opioids PRN and per primary service following discharge from PACU  Post Op Pain Control Plan Comments:   Induction:    Beta Blocker:  Patient is not currently on a Beta-Blocker (No further documentation required).       Informed Consent: Patient understands risks and agrees with Anesthesia plan.  Questions answered. Anesthesia consent signed with patient.  ASA Score: 2     Day of Surgery Review of History & Physical:    H&P update referred to the provider.  H&P completed by Anesthesiologist.   Anesthesia Plan Notes: npo        Ready For Surgery From Anesthesia Perspective.

## 2019-05-15 NOTE — INTERVAL H&P NOTE
The patient has been examined and the H&P has been reviewed:    I concur with the findings and no changes have occurred since H&P was written.    Anesthesia/Surgery risks, benefits and alternative options discussed and understood by patient/family.          Active Hospital Problems    Diagnosis  POA    Sterilization consult [Z30.09]  Yes      Resolved Hospital Problems   No resolved problems to display.     37 yo  with undesired fertility.  Admitted today, 5/15/2019 for laparoscopic tubal cauterization.  Again, procedure explained  Questions answered  She is eager to proceed.    Eron Mcgee MD

## 2019-05-16 LAB — POCT GLUCOSE: 99 MG/DL (ref 70–110)

## 2019-05-17 NOTE — ANESTHESIA POSTPROCEDURE EVALUATION
Anesthesia Post Evaluation    Patient: Félix Sanchez    Procedure(s) Performed: Procedure(s) (LRB):  LIGATION, FALLOPIAN TUBE, LAPAROSCOPIC (Bilateral)    Final Anesthesia Type: general  Patient location during evaluation: PACU  Patient participation: Yes- Able to Participate  Level of consciousness: awake and alert, oriented and awake  Post-procedure vital signs: reviewed and stable  Pain management: adequate  Airway patency: patent  PONV status at discharge: No PONV  Anesthetic complications: no      Cardiovascular status: blood pressure returned to baseline, hemodynamically stable and stable  Respiratory status: unassisted and spontaneous ventilation  Hydration status: euvolemic  Follow-up not needed.          Vitals Value Taken Time   /78 5/15/2019  1:50 PM   Temp 36.5 °C (97.7 °F) 5/15/2019  1:50 PM   Pulse 93 5/15/2019  1:50 PM   Resp 17 5/15/2019  1:50 PM   SpO2 98 % 5/15/2019  1:50 PM         Event Time     Out of Recovery 11:21:04          Pain/Jo Score: No data recorded

## 2019-05-20 ENCOUNTER — ANESTHESIA EVENT (OUTPATIENT)
Dept: SURGERY | Facility: OTHER | Age: 39
End: 2019-05-20
Payer: COMMERCIAL

## 2019-05-21 ENCOUNTER — TELEPHONE (OUTPATIENT)
Dept: ORTHOPEDICS | Facility: CLINIC | Age: 39
End: 2019-05-21

## 2019-05-21 NOTE — TELEPHONE ENCOUNTER
----- Message from Mayo Marvin RN sent at 5/21/2019  2:55 PM CDT -----  Pt called and asked what time she is to arrive tomorrow,can you call her and give her the time.thanks mayo pre-admit

## 2019-05-22 ENCOUNTER — HOSPITAL ENCOUNTER (OUTPATIENT)
Facility: OTHER | Age: 39
Discharge: HOME | End: 2019-05-22
Attending: ORTHOPAEDIC SURGERY | Admitting: ORTHOPAEDIC SURGERY
Payer: COMMERCIAL

## 2019-05-22 ENCOUNTER — ANESTHESIA (OUTPATIENT)
Dept: SURGERY | Facility: OTHER | Age: 39
End: 2019-05-22
Payer: COMMERCIAL

## 2019-05-22 VITALS
HEART RATE: 91 BPM | TEMPERATURE: 98 F | OXYGEN SATURATION: 99 % | HEIGHT: 60 IN | SYSTOLIC BLOOD PRESSURE: 120 MMHG | RESPIRATION RATE: 16 BRPM | WEIGHT: 155 LBS | BODY MASS INDEX: 30.43 KG/M2 | DIASTOLIC BLOOD PRESSURE: 66 MMHG

## 2019-05-22 DIAGNOSIS — M65.4 DE QUERVAIN'S TENOSYNOVITIS, RIGHT: ICD-10-CM

## 2019-05-22 LAB
B-HCG UR QL: NEGATIVE
CTP QC/QA: YES

## 2019-05-22 PROCEDURE — 63600175 PHARM REV CODE 636 W HCPCS: Performed by: NURSE ANESTHETIST, CERTIFIED REGISTERED

## 2019-05-22 PROCEDURE — 37000009 HC ANESTHESIA EA ADD 15 MINS: Performed by: ORTHOPAEDIC SURGERY

## 2019-05-22 PROCEDURE — 01810 ANES PX NRV MUSC F/ARM WRST: CPT | Performed by: ORTHOPAEDIC SURGERY

## 2019-05-22 PROCEDURE — 71000015 HC POSTOP RECOV 1ST HR: Performed by: ORTHOPAEDIC SURGERY

## 2019-05-22 PROCEDURE — 25000003 PHARM REV CODE 250: Performed by: ORTHOPAEDIC SURGERY

## 2019-05-22 PROCEDURE — 36000707: Performed by: ORTHOPAEDIC SURGERY

## 2019-05-22 PROCEDURE — 63600175 PHARM REV CODE 636 W HCPCS: Performed by: ORTHOPAEDIC SURGERY

## 2019-05-22 PROCEDURE — 37000008 HC ANESTHESIA 1ST 15 MINUTES: Performed by: ORTHOPAEDIC SURGERY

## 2019-05-22 PROCEDURE — 25000 PR INCIS TENDON SHEATH,RADIAL STYLOID: ICD-10-PCS | Mod: RT,,, | Performed by: ORTHOPAEDIC SURGERY

## 2019-05-22 PROCEDURE — 25000 INCISION OF TENDON SHEATH: CPT | Mod: RT,,, | Performed by: ORTHOPAEDIC SURGERY

## 2019-05-22 PROCEDURE — 36000706: Performed by: ORTHOPAEDIC SURGERY

## 2019-05-22 PROCEDURE — 81025 URINE PREGNANCY TEST: CPT | Performed by: ORTHOPAEDIC SURGERY

## 2019-05-22 RX ORDER — CEFAZOLIN SODIUM 1 G/3ML
2 INJECTION, POWDER, FOR SOLUTION INTRAMUSCULAR; INTRAVENOUS
Status: DISCONTINUED | OUTPATIENT
Start: 2019-05-22 | End: 2019-05-22 | Stop reason: HOSPADM

## 2019-05-22 RX ORDER — LIDOCAINE HYDROCHLORIDE 10 MG/ML
INJECTION, SOLUTION EPIDURAL; INFILTRATION; INTRACAUDAL; PERINEURAL
Status: DISCONTINUED | OUTPATIENT
Start: 2019-05-22 | End: 2019-05-22 | Stop reason: HOSPADM

## 2019-05-22 RX ORDER — HYDROCODONE BITARTRATE AND ACETAMINOPHEN 5; 325 MG/1; MG/1
1 TABLET ORAL EVERY 4 HOURS PRN
Status: DISCONTINUED | OUTPATIENT
Start: 2019-05-22 | End: 2019-05-22 | Stop reason: HOSPADM

## 2019-05-22 RX ORDER — PROPOFOL 10 MG/ML
VIAL (ML) INTRAVENOUS
Status: DISCONTINUED | OUTPATIENT
Start: 2019-05-22 | End: 2019-05-22

## 2019-05-22 RX ORDER — MIDAZOLAM HYDROCHLORIDE 1 MG/ML
INJECTION INTRAMUSCULAR; INTRAVENOUS
Status: DISCONTINUED | OUTPATIENT
Start: 2019-05-22 | End: 2019-05-22

## 2019-05-22 RX ORDER — PROPOFOL 10 MG/ML
VIAL (ML) INTRAVENOUS CONTINUOUS PRN
Status: DISCONTINUED | OUTPATIENT
Start: 2019-05-22 | End: 2019-05-22

## 2019-05-22 RX ORDER — ONDANSETRON 8 MG/1
8 TABLET, ORALLY DISINTEGRATING ORAL EVERY 8 HOURS PRN
Status: DISCONTINUED | OUTPATIENT
Start: 2019-05-22 | End: 2019-05-22 | Stop reason: HOSPADM

## 2019-05-22 RX ORDER — ACETAMINOPHEN 325 MG/1
650 TABLET ORAL EVERY 4 HOURS PRN
Status: DISCONTINUED | OUTPATIENT
Start: 2019-05-22 | End: 2019-05-22 | Stop reason: HOSPADM

## 2019-05-22 RX ORDER — SODIUM CHLORIDE 0.9 % (FLUSH) 0.9 %
10 SYRINGE (ML) INJECTION
Status: DISCONTINUED | OUTPATIENT
Start: 2019-05-22 | End: 2019-05-22 | Stop reason: HOSPADM

## 2019-05-22 RX ORDER — HYDROCODONE BITARTRATE AND ACETAMINOPHEN 5; 325 MG/1; MG/1
1 TABLET ORAL EVERY 4 HOURS PRN
Qty: 20 TABLET | Refills: 0 | Status: SHIPPED | OUTPATIENT
Start: 2019-05-22 | End: 2019-07-25

## 2019-05-22 RX ORDER — OXYCODONE HYDROCHLORIDE 5 MG/1
10 TABLET ORAL EVERY 4 HOURS PRN
Status: DISCONTINUED | OUTPATIENT
Start: 2019-05-22 | End: 2019-05-22 | Stop reason: HOSPADM

## 2019-05-22 RX ORDER — FENTANYL CITRATE 50 UG/ML
INJECTION, SOLUTION INTRAMUSCULAR; INTRAVENOUS
Status: DISCONTINUED | OUTPATIENT
Start: 2019-05-22 | End: 2019-05-22

## 2019-05-22 RX ADMIN — MIDAZOLAM HYDROCHLORIDE 2 MG: 1 INJECTION, SOLUTION INTRAMUSCULAR; INTRAVENOUS at 08:05

## 2019-05-22 RX ADMIN — PROPOFOL 25 MG: 10 INJECTION, EMULSION INTRAVENOUS at 08:05

## 2019-05-22 RX ADMIN — PROPOFOL 50 MCG/KG/MIN: 10 INJECTION, EMULSION INTRAVENOUS at 08:05

## 2019-05-22 RX ADMIN — FENTANYL CITRATE 100 MCG: 50 INJECTION, SOLUTION INTRAMUSCULAR; INTRAVENOUS at 08:05

## 2019-05-22 RX ADMIN — CEFAZOLIN 2 G: 330 INJECTION, POWDER, FOR SOLUTION INTRAMUSCULAR; INTRAVENOUS at 08:05

## 2019-05-22 NOTE — DISCHARGE INSTRUCTIONS
Anesthesia: Monitored Anesthesia Care (MAC)    Anesthesia Safety  · Have an adult family member or friend drive you home after the procedure.  · For the first 24 hours after your surgery:  ¨ Do not drive or use heavy equipment.  ¨ Do not make important decisions or sign documents.  ¨ Avoid alcohol.  ¨ Have someone stay with you, if possible. They can watch for problems and help keep you safe.    PLEASE FOLLOW ANY OTHER INSTRUCTIONS PROVIDED TO YOU BY DR. KEY!

## 2019-05-22 NOTE — H&P
CC:  Right de Quervain tendonitis           HPI:  Félix Sanchez is a very pleasant 38 y.o. female with ongoing symptoms right hand and wrist of more than 6 months duration  We have tried splinting and injection with only temporary improvement in symptoms now getting worse  She reports pain at the base of the right thumb which is worse with gripping no numbness or tingling is reported            PAST MEDICAL HISTORY:        Past Medical History:   Diagnosis Date    Advanced maternal age in multigravida, second trimester      Anemia      Diabetes mellitus       gestational diet-control     Hypertension        PAST SURGICAL HISTORY:         Past Surgical History:   Procedure Laterality Date    UTERINE FIBROID SURGERY   2008    WISDOM TOOTH EXTRACTION          FAMILY HISTORY:         Family History   Problem Relation Age of Onset    Hypertension Paternal Grandmother      Stroke Maternal Grandmother      Hypertension Father      Hypertension Mother      COPD Mother           smoker      SOCIAL HISTORY:   Social History            Socioeconomic History    Marital status:        Spouse name: Not on file    Number of children: Not on file    Years of education: Not on file    Highest education level: Not on file   Occupational History    Not on file   Social Needs    Financial resource strain: Not on file    Food insecurity:       Worry: Not on file       Inability: Not on file    Transportation needs:       Medical: Not on file       Non-medical: Not on file   Tobacco Use    Smoking status: Never Smoker    Smokeless tobacco: Never Used   Substance and Sexual Activity    Alcohol use: No       Comment: occasionally drinks a few sips of wine    Drug use: No    Sexual activity: Yes       Partners: Male       Birth control/protection: None   Lifestyle    Physical activity:       Days per week: Not on file       Minutes per session: Not on file    Stress: Not on file   Relationships     Social connections:       Talks on phone: Not on file       Gets together: Not on file       Attends Yazidi service: Not on file       Active member of club or organization: Not on file       Attends meetings of clubs or organizations: Not on file       Relationship status: Not on file   Other Topics Concern    Not on file   Social History Narrative     Together since 9/2016     He is a cook at [employer]     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         MEDICATIONS:   Current Outpatient Medications:     gabapentin (NEURONTIN) 300 MG capsule, , Disp: , Rfl:     naproxen (NAPROSYN) 500 MG tablet, Take 1 tablet (500 mg total) by mouth 2 (two) times daily with meals., Disp: 60 tablet, Rfl: 1    NIFEdipine (ADALAT CC) 30 MG TbSR, , Disp: , Rfl:     norethindrone (MICRONOR) 0.35 mg tablet, , Disp: , Rfl:     PRENATAL 105-IRON-FOLIC AC-DHA ORAL, Take by mouth., Disp: , Rfl:     valACYclovir (VALTREX) 1000 MG tablet, , Disp: , Rfl:   ALLERGIES: Review of patient's allergies indicates:  No Known Allergies     Review of Systems:  Constitutional: no fever or chills  ENT: no nasal congestion or sore throat  Respiratory: no cough or shortness of breath  Cardiovascular: no chest pain or palpitations  Gastrointestinal: no nausea or vomiting, PUD, GERD, NSAID intolerance  Genitourinary: no hematuria or dysuria  Integument/Breast: no rash or pruritis  Hematologic/Lymphatic: no easy bruising or lymphadenopathy  Musculoskeletal: see HPI  Neurological: no seizures or tremors  Behavioral/Psych: no auditory or visual hallucinations        Physical Exam       Vitals:     04/03/19 1516   Weight: 67.9 kg (149 lb 11.1 oz)   Height: 5' (1.524 m)   PainSc:   2         Constitutional: Oriented to person, place, and time. Appears well-developed and well-nourished.   HENT:   Head: Normocephalic and atraumatic.   Nose: Nose normal.   Eyes: No scleral icterus.   Neck: Normal range of motion.    Cardiovascular: Normal rate and regular rhythm.    Pulses:       Radial pulses are 2+ on the right side, and 2+ on the left side.   Pulmonary/Chest: Effort normal and breath sounds normal.   Abdominal: Soft.   Neurological: Alert and oriented to person, place, and time.   Skin: Skin is warm.   Psychiatric: Normal mood and affect.      MUSCULOSKELETAL UPPER EXTREMITY:  Examination of the right upper extremity demonstrates tenderness over the 1st dorsal compartment of the wrist  Mild swelling full range of motion wrist fingers  Positive Finkelstein test  Negative Tinel sign negative Phalen's test  Skin color and temperature normal all digits sensation intact all digits                    Diagnostic Studies:  None           Assessment:  De Quervain tendonitis right wrist     Plan:  We will set up surgery for de Quervain release right wrist as an outpatient surgery the risks and benefits of surgery explained to her today she understands        The risks and benefits of surgery were discussed with the patient today and they understand.  The consent was signed in the office for surgery.

## 2019-05-22 NOTE — OR NURSING
Pt arrived to Phase II with IV of 600 ml LR infusing into left hand IV site.  Site without redness or swelling.  Biopatch in place.  Site clean, dry and intact.

## 2019-05-22 NOTE — PLAN OF CARE
Maria Elenahamlet Sanchez has met all discharge criteria from Phase II. Vital Signs are stable, ambulating  without difficulty. Discharge instructions given, patient verbalized understanding. Discharged from facility via wheelchair in stable condition.

## 2019-05-22 NOTE — ANESTHESIA POSTPROCEDURE EVALUATION
Anesthesia Post Evaluation    Patient: Lacordia Ethel Sanchez    Procedure(s) Performed: Procedure(s) (LRB):  RELEASE, HAND, FOR DEQUERVAIN'S TENOSYNOVITIS (Right)    Final Anesthesia Type: general  Patient location during evaluation: [facility]  Patient participation: Yes- Able to Participate  Level of consciousness: awake and alert  Post-procedure vital signs: reviewed and stable  Pain management: adequate  Airway patency: patent  PONV status at discharge: No PONV  Anesthetic complications: no      Cardiovascular status: blood pressure returned to baseline  Respiratory status: unassisted and spontaneous ventilation  Hydration status: euvolemic  Follow-up not needed.          Vitals Value Taken Time   /66 5/22/2019  8:51 AM   Temp 36.4 °C (97.6 °F) 5/22/2019  6:51 AM   Pulse 88 5/22/2019  8:51 AM   Resp 16 5/22/2019  8:51 AM   SpO2 99 5/22/2019  8:51 AM         No case tracking events are documented in the log.      Pain/Jo Score: No data recorded

## 2019-05-22 NOTE — ANESTHESIA PREPROCEDURE EVALUATION
05/22/2019  Félix Sanchez is a 38 y.o., female.    Anesthesia Evaluation    I have reviewed the Patient Summary Reports.    I have reviewed the Nursing Notes.   I have reviewed the Medications.     Review of Systems  Anesthesia Hx:  No previous Anesthesia  History of prior surgery of interest to airway management or planning: Previous anesthesia: General BTL one week ago with general anesthesia.  Procedure performed at an [facility]. Denies Family Hx of Anesthesia complications.   Denies Personal Hx of Anesthesia complications.   Social:  Non-Smoker    Hematology/Oncology:  Hematology Normal   Oncology Normal     EENT/Dental:EENT/Dental Normal   Cardiovascular:   Hypertension    Pulmonary:  Pulmonary Normal    Renal/:  Renal/ Normal     Hepatic/GI:  Hepatic/GI Normal    Musculoskeletal:  Musculoskeletal Normal    Neurological:  Neurology Normal    Endocrine:  Endocrine Normal    Dermatological:  Skin Normal    Psych:  Psychiatric Normal           Physical Exam  General:  Well nourished    Airway/Jaw/Neck:  Airway Findings: General Airway Assessment: Possible difficult intubation     Dental:  Dental Findings: Molar caps        Mental Status:  Mental Status Findings:  Cooperative, Alert and Oriented         Anesthesia Plan  Type of Anesthesia, risks & benefits discussed:  Anesthesia Type:  MAC  Patient's Preference:   Intra-op Monitoring Plan:   Intra-op Monitoring Plan Comments:   Post Op Pain Control Plan: multimodal analgesia  Post Op Pain Control Plan Comments:   Induction:   IV  Beta Blocker:         Informed Consent: Patient understands risks and agrees with Anesthesia plan.  Questions answered. Anesthesia consent signed with patient.  ASA Score: 2     Day of Surgery Review of History & Physical:    H&P update referred to the surgeon.     Anesthesia Plan Notes: Plan MAC        Ready For  Surgery From Anesthesia Perspective.

## 2019-05-22 NOTE — INTERVAL H&P NOTE
The patient has been examined and the H&P has been reviewed:    I concur with the findings and no changes have occurred since H&P was written.    Anesthesia/Surgery risks, benefits and alternative options discussed and understood by patient/family.          Active Hospital Problems    Diagnosis  POA    De Quervain's tenosynovitis, right [M65.4]  Yes      Resolved Hospital Problems   No resolved problems to display.

## 2019-05-22 NOTE — OP NOTE
Operative Note       Surgery Date: 5/22/2019     Surgeon(s) and Role:     * Evelio Au Jr., MD - Primary    Pre-op Diagnosis:  De Quervain's tenosynovitis, right [M65.4]    Post-op Diagnosis: Post-Op Diagnosis Codes:     * De Quervain's tenosynovitis, right [M65.4]    Procedure(s) (LRB):  RELEASE, HAND, FOR DEQUERVAIN'S TENOSYNOVITIS (Right)    Anesthesia: Local MAC    Procedure in Detail/Findings:  DATE OF PROCEDURE:   5/22/2019     PREOPERATIVE DIAGNOSIS:  rightwrist de Quervain's tendinitis.     POSTOPERATIVE DIAGNOSES: right wrist De Quervain's tendinitis.     OPERATIVE PROCEDURE:  Release of first dorsal compartment,right wrist.     SURGEON:  Evelio Au Jr. MCAROL.     FIRST ASSISTANT:  none     ANESTHESIA:  MAC.     ESTIMATED BLOOD LOSS:  Minimal.     COMPLICATIONS:  None.     SPECIMENS:  None.     BRIEF INDICATIONS:  A 38-year-old female with De Quervain tendinitis, unresponsive to conservative treatment.     OPERATIVE PROCEDURE IN DETAIL:  After operative consent was obtained, the   patient brought to the Operating Room, placed supine on the operating room   table.  Anesthesia by IV sedation followed by injection of Xylocaine, Marcaine   combination at the operative site.  Following this, tourniquet applied to the   arm.  The arm then prepped and draped out in the normal sterile fashion.  The   Esmarch used to exsanguinate the limb and the tourniquet inflated to 225 mmHg.     Following this, a transverse radial incision made with a #15 blade directly over   the first dorsal compartment of the wrist.  Careful dissection was used to   identify the superficial radial nerve branches, which were protected throughout   the procedure.  The first dorsal compartment was then opened longitudinally on   the dorsal aspect of the Republic blade.  The APL tendon was identified, but a   second subsheath compartment was also found and this was released with the   Republic blade as well.  All 3 tendons slips were then  carefully freed up and   freed proximally and distally to prevent any impingement.  The volar flap of   tissue was left intact to prevent any subluxation of the tendons.  The wound   irrigated thoroughly.  Hemostasis achieved with a Bovie.  The skin closed with   interrupted 4-0 Monocryl on the subcutaneous layer and Steri-Strips on the skin.    Sterile dressing applied followed by a Velcro wrist splint.  The tourniquet   deflated.  The patient was brought to the Recovery Room in stable condition.    All sponge and needle counts reported as correct.  No complications.           Estimated Blood Loss: * No values recorded between 5/22/2019  8:31 AM and 5/22/2019  8:48 AM *           Specimens (From admission, onward)    None        Implants: * No implants in log *           Disposition: PACU - hemodynamically stable.           Condition: Good    Attestation:  I was present and scrubbed for the entire procedure.           Discharge Note    Admit Date: 5/22/2019    Attending Physician: Evelio Au Jr., MD     Discharge Physician: Evelio Au Jr., MD    Final Diagnosis: Post-Op Diagnosis Codes:     * De Quervain's tenosynovitis, right [M65.4]    Disposition: Home or Self Care    Patient Instructions:   Current Discharge Medication List      START taking these medications    Details   HYDROcodone-acetaminophen (NORCO) 5-325 mg per tablet Take 1 tablet by mouth every 4 (four) hours as needed for Pain.  Qty: 20 tablet, Refills: 0         CONTINUE these medications which have NOT CHANGED    Details   gabapentin (NEURONTIN) 300 MG capsule Take 300 mg by mouth every evening.       ibuprofen (ADVIL,MOTRIN) 600 MG tablet Take 1 tablet (600 mg total) by mouth every 6 (six) hours as needed for Pain.  Qty: 40 tablet, Refills: 1      NIFEdipine (ADALAT CC) 30 MG TbSR Take 30 mg by mouth once daily.       PRENATAL 105-IRON-FOLIC AC-DHA ORAL Take 1 tablet by mouth once daily.       valACYclovir (VALTREX) 1000 MG tablet               Discharge Procedure Orders (must include Diet, Follow-up, Activity)   Discharge Procedure Orders (must include Diet, Follow-up, Activity)   Diet general     Call MD for:  temperature >100.4     Call MD for:  persistent nausea and vomiting     Call MD for:  severe uncontrolled pain     Keep surgical extremity elevated     Remove dressing in 72 hours     Shower on day dressing removed (No bath)        Discharge Date: No discharge date for patient encounter.

## 2019-05-29 ENCOUNTER — OFFICE VISIT (OUTPATIENT)
Dept: ORTHOPEDICS | Facility: CLINIC | Age: 39
End: 2019-05-29
Attending: ORTHOPAEDIC SURGERY
Payer: COMMERCIAL

## 2019-05-29 VITALS — BODY MASS INDEX: 30.43 KG/M2 | WEIGHT: 155 LBS | HEIGHT: 60 IN

## 2019-05-29 DIAGNOSIS — M65.4 DE QUERVAIN'S TENOSYNOVITIS, RIGHT: Primary | ICD-10-CM

## 2019-05-29 PROCEDURE — 99024 PR POST-OP FOLLOW-UP VISIT: ICD-10-PCS | Mod: S$GLB,,, | Performed by: ORTHOPAEDIC SURGERY

## 2019-05-29 PROCEDURE — 99999 PR PBB SHADOW E&M-EST. PATIENT-LVL III: ICD-10-PCS | Mod: PBBFAC,,, | Performed by: ORTHOPAEDIC SURGERY

## 2019-05-29 PROCEDURE — 99999 PR PBB SHADOW E&M-EST. PATIENT-LVL III: CPT | Mod: PBBFAC,,, | Performed by: ORTHOPAEDIC SURGERY

## 2019-05-29 PROCEDURE — 99024 POSTOP FOLLOW-UP VISIT: CPT | Mod: S$GLB,,, | Performed by: ORTHOPAEDIC SURGERY

## 2019-05-29 NOTE — PROGRESS NOTES
Subjective:      Patient ID: Félix Sanchez is a 38 y.o. female.  Chief Complaint: Pain, Post-op Evaluation, and Follow-up of the Right Hand      HPI  Félix Sanchez is a  38 y.o. female presenting today for post op visit.  She is s/p de Quervain release of the right wrist  She is 1 week postop doing well.     Review of patient's allergies indicates:  No Known Allergies      Current Outpatient Medications   Medication Sig Dispense Refill    gabapentin (NEURONTIN) 300 MG capsule Take 300 mg by mouth every evening.       HYDROcodone-acetaminophen (NORCO) 5-325 mg per tablet Take 1 tablet by mouth every 4 (four) hours as needed for Pain. 20 tablet 0    ibuprofen (ADVIL,MOTRIN) 600 MG tablet Take 1 tablet (600 mg total) by mouth every 6 (six) hours as needed for Pain. 40 tablet 1    NIFEdipine (ADALAT CC) 30 MG TbSR Take 30 mg by mouth once daily.       PRENATAL 105-IRON-FOLIC AC-DHA ORAL Take 1 tablet by mouth once daily.       valACYclovir (VALTREX) 1000 MG tablet        No current facility-administered medications for this visit.        Past Medical History:   Diagnosis Date    Advanced maternal age in multigravida, second trimester     Anemia     Diabetes mellitus     gestational diet-control     Hypertension        Past Surgical History:   Procedure Laterality Date    LIGATION, FALLOPIAN TUBE, LAPAROSCOPIC Bilateral 5/15/2019    Per[facility] OR    RELEASE, HAND, FOR DEQUERVAIN'S TENOSYNOVITIS Right 5/22/2019    Performed by Evelio Au Jr., MD at [facility] [place], operated by [facility] OR    UTERINE FIBROID SURGERY  2008    WISDOM TOOTH EXTRACTION         OBJECTIVE:   PHYSICAL EXAM:  Height: 5' (152.4 cm) Weight: 70.3 kg (155 lb)  Vitals:    05/29/19 1428   Weight: 70.3 kg (155 lb)   Height: 5' (1.524 m)   PainSc:   2   PainLoc: Hand     Ortho/SPM Exam  Examination right wrist incision looks good healing well skin glue still in place post swelling is minimal no evidence of infection  Range  of motion wrist fingers full    RADIOGRAPHS:  None  Comments: I have personally reviewed the imaging and I agree with the above radiologist's report.    ASSESSMENT/PLAN:     IMPRESSION:  De Quervain release of right wrist    PLAN:  Routine wound care light activities part-time use wrist splint especially for lifting increase activities as tolerated    FOLLOW UP:  3 weeks    Disclaimer: This note has been generated using voice-recognition software. There may be typographical errors that have been missed during proof-reading.

## 2019-06-19 ENCOUNTER — OFFICE VISIT (OUTPATIENT)
Dept: ORTHOPEDICS | Facility: CLINIC | Age: 39
End: 2019-06-19
Attending: ORTHOPAEDIC SURGERY
Payer: COMMERCIAL

## 2019-06-19 VITALS — HEIGHT: 60 IN | WEIGHT: 155 LBS | BODY MASS INDEX: 30.43 KG/M2

## 2019-06-19 DIAGNOSIS — M65.4 DE QUERVAIN'S TENOSYNOVITIS, RIGHT: Primary | ICD-10-CM

## 2019-06-19 PROCEDURE — 99024 POSTOP FOLLOW-UP VISIT: CPT | Mod: S$GLB,,, | Performed by: ORTHOPAEDIC SURGERY

## 2019-06-19 PROCEDURE — 99999 PR PBB SHADOW E&M-EST. PATIENT-LVL III: CPT | Mod: PBBFAC,,, | Performed by: ORTHOPAEDIC SURGERY

## 2019-06-19 PROCEDURE — 99024 PR POST-OP FOLLOW-UP VISIT: ICD-10-PCS | Mod: S$GLB,,, | Performed by: ORTHOPAEDIC SURGERY

## 2019-06-19 PROCEDURE — 99999 PR PBB SHADOW E&M-EST. PATIENT-LVL III: ICD-10-PCS | Mod: PBBFAC,,, | Performed by: ORTHOPAEDIC SURGERY

## 2019-06-19 RX ORDER — DICLOFENAC SODIUM 10 MG/G
2 GEL TOPICAL 3 TIMES DAILY
Qty: 1 TUBE | Refills: 3 | Status: SHIPPED | OUTPATIENT
Start: 2019-06-19 | End: 2021-08-09

## 2019-06-19 RX ORDER — TRAMADOL HYDROCHLORIDE 50 MG/1
50 TABLET ORAL EVERY 6 HOURS PRN
Qty: 40 TABLET | Refills: 0 | Status: SHIPPED | OUTPATIENT
Start: 2019-06-19 | End: 2019-07-25

## 2019-06-19 RX ORDER — TRAMADOL HYDROCHLORIDE 50 MG/1
50 TABLET ORAL EVERY 6 HOURS PRN
Qty: 40 TABLET | Refills: 0 | Status: SHIPPED | OUTPATIENT
Start: 2019-06-19 | End: 2019-06-19 | Stop reason: SDUPTHER

## 2019-06-24 ENCOUNTER — TELEPHONE (OUTPATIENT)
Dept: ORTHOPEDICS | Facility: CLINIC | Age: 39
End: 2019-06-24

## 2019-06-24 NOTE — TELEPHONE ENCOUNTER
----- Message from Berta Luo sent at 6/24/2019  1:13 PM CDT -----  Contact: [phone] [facility]   Pharmacy would like to speak with you about pt's medication. Please advise.

## 2019-07-08 ENCOUNTER — TELEPHONE (OUTPATIENT)
Dept: OBSTETRICS AND GYNECOLOGY | Facility: CLINIC | Age: 39
End: 2019-07-08

## 2019-07-08 DIAGNOSIS — I10 ESSENTIAL HYPERTENSION: Primary | ICD-10-CM

## 2019-07-08 RX ORDER — NIFEDIPINE 30 MG/1
30 TABLET, FILM COATED, EXTENDED RELEASE ORAL DAILY
Qty: 30 TABLET | Refills: 1 | Status: SHIPPED | OUTPATIENT
Start: 2019-07-08 | End: 2021-08-09

## 2019-07-10 ENCOUNTER — TELEPHONE (OUTPATIENT)
Dept: ORTHOPEDICS | Facility: CLINIC | Age: 39
End: 2019-07-10
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
## 2019-07-17 ENCOUNTER — TELEPHONE (OUTPATIENT)
Dept: ORTHOPEDICS | Facility: CLINIC | Age: 39
End: 2019-07-17
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
## 2019-07-25 ENCOUNTER — OFFICE VISIT (OUTPATIENT)
Dept: OBSTETRICS AND GYNECOLOGY | Facility: CLINIC | Age: 39
End: 2019-07-25
Payer: COMMERCIAL

## 2019-07-25 VITALS
SYSTOLIC BLOOD PRESSURE: 120 MMHG | BODY MASS INDEX: 29.73 KG/M2 | WEIGHT: 151.44 LBS | DIASTOLIC BLOOD PRESSURE: 70 MMHG | HEIGHT: 60 IN

## 2019-07-25 DIAGNOSIS — Z09 POSTOP CHECK: Primary | ICD-10-CM

## 2019-07-25 DIAGNOSIS — K64.4 EXTERNAL HEMORRHOIDS: ICD-10-CM

## 2019-07-25 PROCEDURE — 99999 PR PBB SHADOW E&M-EST. PATIENT-LVL III: CPT | Mod: PBBFAC,,, | Performed by: OBSTETRICS & GYNECOLOGY

## 2019-07-25 PROCEDURE — 99999 PR PBB SHADOW E&M-EST. PATIENT-LVL III: ICD-10-PCS | Mod: PBBFAC,,, | Performed by: OBSTETRICS & GYNECOLOGY

## 2019-07-25 PROCEDURE — 99024 POSTOP FOLLOW-UP VISIT: CPT | Mod: S$GLB,,, | Performed by: OBSTETRICS & GYNECOLOGY

## 2019-07-25 PROCEDURE — 99024 PR POST-OP FOLLOW-UP VISIT: ICD-10-PCS | Mod: S$GLB,,, | Performed by: OBSTETRICS & GYNECOLOGY

## 2019-07-25 RX ORDER — VALACYCLOVIR HYDROCHLORIDE 500 MG/1
TABLET, FILM COATED ORAL
Refills: 2 | COMMUNITY
Start: 2019-07-08

## 2019-07-25 NOTE — PROGRESS NOTES
Subjective:       Patient ID: Félix Sanchez is a 38 y.o. female.    Chief Complaint:  Post-op Evaluation (PO since LTC on 05/15/19)      History of Present Illness  HPI  Ms Sanchez is a 38 years old, status post laparoscopic tubal cauterization on 5/15/2019.  She comes in today for an exam and followup.  Patient has no current complaints.  No fever or chills.  No nausea or vomiting.  No diarrhea or constipation.  No abdominal or pelvic pain.    She has resumed normal intercourse.  Patient has begun some walking for exercise. She denies having signs and symptoms of significant depression.  She is tolerating oral intake well.    GYN & OB History  No LMP recorded. (Menstrual status: Other).   Date of Last Pap: No result found    OB History    Para Term  AB Living   3 2 2   1 2   SAB TAB Ectopic Multiple Live Births   0 1 0 0 2      # Outcome Date GA Lbr Gildardo/2nd Weight Sex Delivery Anes PTL Lv   3 Term 18 38w2d  3.475 kg (7 lb 10.6 oz) F Vag-Spont EPI N JESS   2 Term 09 39w0d  2.977 kg (6 lb 9 oz) M Vag-Spont  N    1 TAB              Past Medical History:   Diagnosis Date    Advanced maternal age in multigravida, second trimester     Anemia     Diabetes mellitus     gestational diet-control     Hypertension        Past Surgical History:   Procedure Laterality Date    LIGATION, FALLOPIAN TUBE, LAPAROSCOPIC Bilateral 5/15/2019    Performed [facility] OR    RELEASE, HAND, FOR DEQUERVAIN'S TENOSYNOVITIS Right 2019    Performed by Evelio Au Jr., MD at [facility], [place], operated by [facility] OR    UTERINE FIBROID SURGERY  2008    WISDOM TOOTH EXTRACTION         Family History   Problem Relation Age of Onset    Hypertension Paternal Grandmother     Stroke Maternal Grandmother     Hypertension Father     Hypertension Mother     COPD Mother         smoker       Social History     Socioeconomic History    Marital status: Single     Spouse name: Not on file    Number of children: Not on  file    Years of education: Not on file    Highest education level: Not on file   Occupational History    Not on file   Social Needs    Financial resource strain: Not on file    Food insecurity:     Worry: Not on file     Inability: Not on file    Transportation needs:     Medical: Not on file     Non-medical: Not on file   Tobacco Use    Smoking status: Never Smoker    Smokeless tobacco: Never Used   Substance and Sexual Activity    Alcohol use: No     Comment: occasionally drinks a few sips of wine    Drug use: No    Sexual activity: Yes     Partners: Male     Birth control/protection: None   Lifestyle    Physical activity:     Days per week: Not on file     Minutes per session: Not on file    Stress: Not on file   Relationships    Social connections:     Talks on phone: Not on file     Gets together: Not on file     Attends Spiritism service: Not on file     Active member of club or organization: Not on file     Attends meetings of clubs or organizations: Not on file     Relationship status: Not on file   Other Topics Concern    Not on file   Social History Narrative    Together since 9/2016    He is a cook at [employer]    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 1 Tube 3    gabapentin (NEURONTIN) 300 MG capsule Take 300 mg by mouth every evening.       NIFEdipine (ADALAT CC) 30 MG TbSR Take 1 tablet (30 mg total) by mouth once daily. 30 tablet 1    PRENATAL 105-IRON-FOLIC AC-DHA ORAL Take 1 tablet by mouth once daily.       valACYclovir (VALTREX) 500 MG tablet TAKE 1 TABLET BY MOUTH TWICE DAILY FOR 3 DAYS AS NEEDED FOR RECURRENT OUTBREAK  2     No current facility-administered medications for this visit.        Review of patient's allergies indicates:  No Known Allergies    Review of Systems  Review of Systems    Constitutional: Negative for activity change, appetite change, chills, fatigue, fever and unexpected weight change.   HENT: Negative for mouth sores.    Respiratory: Negative for cough, shortness of breath and wheezing.    Cardiovascular: Negative for chest pain and palpitations.   Gastrointestinal: Negative for abdominal pain, bloating, blood in stool, constipation, nausea and vomiting.        Hemorrhoids    Endocrine: Negative for diabetes and hot flashes.   Genitourinary: Negative for dysmenorrhea, dyspareunia, dysuria, frequency, hematuria, menorrhagia, menstrual problem, pelvic pain, urgency, vaginal bleeding, vaginal discharge, vaginal pain, urinary incontinence, postcoital bleeding and vaginal odor.   Musculoskeletal: Negative for back pain and myalgias.   Integumentary:  Negative for rash, breast mass and nipple discharge.   Neurological: Negative for seizures and headaches.   Psychiatric/Behavioral: Negative for depression and sleep disturbance. The patient is not nervous/anxious.    Breast: Negative for mass, mastodynia and nipple discharge          Objective:    Physical Exam:   Constitutional: She appears well-developed and well-nourished. No distress.    HENT:   Head: Normocephalic and atraumatic.    Eyes: EOM are normal.    Neck: Normal range of motion.    Cardiovascular: Normal rate.     Pulmonary/Chest: Effort normal. No respiratory distress.        Abdominal: Soft. She exhibits no distension. There is no tenderness. There is no rebound and no guarding.   Trochar site healed.             Musculoskeletal: Normal range of motion.       Neurological: She is alert.    Skin: Skin is warm and dry.    Psychiatric: She has a normal mood and affect.          Assessment:        1. Postop check    2.  External hemorrhoids         Plan:      I have discussed with the patient regarding her condition.  She has recovered well from surgery.  Would like to get referral to see General Surgery for her  hemorrhoids    Back in a couple of months for Pap.

## 2019-09-23 ENCOUNTER — TELEPHONE (OUTPATIENT)
Dept: SURGERY | Facility: CLINIC | Age: 39
End: 2019-09-23

## 2019-09-23 NOTE — TELEPHONE ENCOUNTER
----- Message from Trudy Funes sent at 9/23/2019 10:16 AM CDT -----  Contact: KEISHA LEWIS   Name of Who is Calling: KEISHA LEWIS       What is the request in detail: Patient is requesting a call back to schedule a appointment she was referred by  she has Hemorrhoids       Can the clinic reply by [facility]: no      What Number to Call Back if not in [facility]: [phone]

## 2019-09-25 ENCOUNTER — OFFICE VISIT (OUTPATIENT)
Dept: SURGERY | Facility: CLINIC | Age: 39
End: 2019-09-25
Payer: COMMERCIAL

## 2019-09-25 VITALS
HEART RATE: 67 BPM | SYSTOLIC BLOOD PRESSURE: 124 MMHG | WEIGHT: 153.69 LBS | HEIGHT: 60 IN | DIASTOLIC BLOOD PRESSURE: 86 MMHG | OXYGEN SATURATION: 100 % | BODY MASS INDEX: 30.17 KG/M2

## 2019-09-25 DIAGNOSIS — K64.9 HEMORRHOIDS, UNSPECIFIED HEMORRHOID TYPE: Primary | ICD-10-CM

## 2019-09-25 PROCEDURE — 99999 PR PBB SHADOW E&M-EST. PATIENT-LVL III: ICD-10-PCS | Mod: PBBFAC,,, | Performed by: SURGERY

## 2019-09-25 PROCEDURE — 99202 PR OFFICE/OUTPT VISIT, NEW, LEVL II, 15-29 MIN: ICD-10-PCS | Mod: S$GLB,,, | Performed by: SURGERY

## 2019-09-25 PROCEDURE — 99202 OFFICE O/P NEW SF 15 MIN: CPT | Mod: S$GLB,,, | Performed by: SURGERY

## 2019-09-25 PROCEDURE — 99999 PR PBB SHADOW E&M-EST. PATIENT-LVL III: CPT | Mod: PBBFAC,,, | Performed by: SURGERY

## 2019-09-25 NOTE — PROGRESS NOTES
38-year-old woman with a history of external hemorrhoids.  She reports since about 1990 she had internal hemorrhoids which became painful possibly thrombosed but eventually went away with conservative treatment.  She has had problems with hemorrhoids on and off and has not had internal or thrombosed hemorrhoids in a long time.  She does report now external hemorrhoids which she would like to discuss having removed. She reports they are asymptomatic current but do present a problem for hygiene.    Physical exam:  External hemorrhoids nonthrombosed, noninflamed    Plan:  After prolonged discussion with the patient given the asymptomatic nature of these external hemorrhoids I recommend waiting 6 months to a year and having a repeat exam if they continue to evolve into contracted external hemorrhoids into a skin tag we can consider removal at that time however removal can be a painful operation.  Is for this reason she will defer surgery current leak in follow-up in 6 months for repeat exam.

## 2019-09-25 NOTE — PATIENT INSTRUCTIONS
Understanding Hemorrhoids    Hemorrhoid tissues are cushions of blood vessels that swell slightly during bowel movements. Too much pressure on the anal canal can make these tissues remain enlarged, become inflamed, and cause symptoms. This can happen both inside and outside the anal canal.  Parts of the anal canal  The parts of the anal canal are:  · Internal hemorrhoid tissue is in the upper area of the anal canal.  · The rectum is the last several inches of the colon. This is where stool is stored prior to bowel movements.  · Anal sphincters are ring-shaped muscles that expand and contract to control the anal opening.  · External hemorrhoid tissue lies under the anal skin.  · The anus is the passage between the rectum and the outside of the body.  Normal hemorrhoid tissue  Hemorrhoid tissues play an important role in helping your body eliminate waste. Food passes from the stomach through the intestines. The waste (stool) then travels through the colon to the rectum. It is stored in the rectum until its ready to be passed from the anus. During bowel movements, hemorrhoids swell with blood and become slightly larger. This swelling helps protect and cushion the anal canal as stool passes from the body. Once the stool has passed, the tissues stop swelling and return to normal.  Problem hemorrhoids  Pressure due to straining or other factors can cause hemorrhoid tissues to remain swollen. When this happens to the hemorrhoid tissues in the anal canal theyre called internal hemorrhoids. Swollen tissues around the anal opening are called external hemorrhoids. Depending on the location, your symptoms can differ.  · Internal hemorrhoids often happen in clusters around the wall of the anal canal. They are usually painless. But they may prolapse (protrude out of the anus) due to straining or pressure from hard stool. After the bowel movement is over, they may then reduce (return inside the body). Internal hemorrhoids  often bleed. They can also discharge mucus.  ·   External hemorrhoids are located at the anal opening, just beneath the skin. These tissues rarely cause problems unless they thrombose (form a blood clot). When this happens, a hard, bluish lump may appear. A thrombosed hemorrhoid also causes sudden, severe pain. In time, the clot may go away on its own. This sometimes leaves a skin tag of tissue stretched by the clot.  Hemorrhoid symptoms  Hemorrhoid symptoms may include:  · Pain or a burning sensation  · Bleeding during bowel movements  · Protrusion of tissue from the anus  · Itching around the anus  Causes of hemorrhoids  Theres no single cause of hemorrhoids. Most often, though, they are caused by too much pressure on the anal canal. This can be due to:  · Chronic (ongoing) constipation  · Straining during bowel movements  · Sitting too long on the toilet  · Strenuous exercise or heavy lifting  · Pregnancy and childbirth  · Aging  · Diarrhea  Date Last Reviewed: 7/1/2016  © 2098-2817 [employer]. [address] [place] [address], [place] [address]. All rights reserved. This information is not intended as a substitute for professional medical care. Always follow your healthcare professional's instructions.

## 2020-01-23 NOTE — PROGRESS NOTES
Subjective:      Patient ID: Félix Sanchez is a 38 y.o. female.  Chief Complaint: Pain of the Right Hand      HPI  Félix Sanchez is a  38 y.o. female presenting today for post op visit.  She is s/p de Quervain release right wrist  Four weeks postop  Mild soreness reported.     Review of patient's allergies indicates:  No Known Allergies      Current Outpatient Medications   Medication Sig Dispense Refill    gabapentin (NEURONTIN) 300 MG capsule Take 300 mg by mouth every evening.       HYDROcodone-acetaminophen (NORCO) 5-325 mg per tablet Take 1 tablet by mouth every 4 (four) hours as needed for Pain. 20 tablet 0    ibuprofen (ADVIL,MOTRIN) 600 MG tablet Take 1 tablet (600 mg total) by mouth every 6 (six) hours as needed for Pain. 40 tablet 1    NIFEdipine (ADALAT CC) 30 MG TbSR Take 30 mg by mouth once daily.       PRENATAL 105-IRON-FOLIC AC-DHA ORAL Take 1 tablet by mouth once daily.       valACYclovir (VALTREX) 1000 MG tablet       diclofenac sodium (VOLTAREN) 1 % Gel Apply 2 g topically 3 (three) times daily. 1 Tube 3    traMADol (ULTRAM) 50 mg tablet Take 1 tablet (50 mg total) by mouth every 6 (six) hours as needed for Pain. 40 tablet 0     No current facility-administered medications for this visit.        Past Medical History:   Diagnosis Date    Advanced maternal age in multigravida, second trimester     Anemia     Diabetes mellitus     gestational diet-control     Hypertension        Past Surgical History:   Procedure Laterality Date    LIGATION, FALLOPIAN TUBE, LAPAROSCOPIC Bilateral 5/15/2019    Performed by [facility] at [facility] OR    RELEASE, HAND, FOR DEQUERVAIN'S TENOSYNOVITIS Right 5/22/2019    Per[facility]    UTERINE FIBROID SURGERY  2008    WISDOM TOOTH EXTRACTION         OBJECTIVE:   PHYSICAL EXAM:  Height: 5' (152.4 cm) Weight: 70.3 kg (155 lb)  Vitals:    06/19/19 1606   Weight: 70.3 kg (155 lb)   Height: 5' (1.524 m)    PainSc:   3   PainLoc: Hand     Ortho/SPM Exam  Examination right wrist incision well-healed minimal swelling slight tenderness range of motion wrist fingers full   strength slightly decreased    RADIOGRAPHS:  None  Comments: I have personally reviewed the imaging and I agree with the above radiologist's report.    ASSESSMENT/PLAN:     IMPRESSION:  Status post de Quervain release right wrist    PLAN:  I have ordered some Voltaren gel for topical use recommended strengthening with a squeeze ball  Discontinue the wrist splint  Increase activities as tolerated    FOLLOW UP:  3-4 weeks    Disclaimer: This note has been generated using voice-recognition software. There may be typographical errors that have been missed during proof-reading.   Patient

## 2021-04-26 ENCOUNTER — PATIENT MESSAGE (OUTPATIENT)
Dept: RESEARCH | Facility: HOSPITAL | Age: 41
End: 2021-04-26

## 2021-08-09 ENCOUNTER — OFFICE VISIT (OUTPATIENT)
Dept: OBSTETRICS AND GYNECOLOGY | Facility: CLINIC | Age: 41
End: 2021-08-09
Payer: COMMERCIAL

## 2021-08-09 VITALS
BODY MASS INDEX: 32.89 KG/M2 | WEIGHT: 167.56 LBS | SYSTOLIC BLOOD PRESSURE: 120 MMHG | DIASTOLIC BLOOD PRESSURE: 68 MMHG | HEIGHT: 60 IN

## 2021-08-09 DIAGNOSIS — Z01.419 WELL WOMAN EXAM WITH ROUTINE GYNECOLOGICAL EXAM: Primary | ICD-10-CM

## 2021-08-09 DIAGNOSIS — Z12.31 SCREENING MAMMOGRAM, ENCOUNTER FOR: ICD-10-CM

## 2021-08-09 PROCEDURE — 99999 PR PBB SHADOW E&M-EST. PATIENT-LVL III: ICD-10-PCS | Mod: PBBFAC,,, | Performed by: OBSTETRICS & GYNECOLOGY

## 2021-08-09 PROCEDURE — 1126F PR PAIN SEVERITY QUANTIFIED, NO PAIN PRESENT: ICD-10-PCS | Mod: CPTII,S$GLB,, | Performed by: OBSTETRICS & GYNECOLOGY

## 2021-08-09 PROCEDURE — 99999 PR PBB SHADOW E&M-EST. PATIENT-LVL III: CPT | Mod: PBBFAC,,, | Performed by: OBSTETRICS & GYNECOLOGY

## 2021-08-09 PROCEDURE — 99396 PR PREVENTIVE VISIT,EST,40-64: ICD-10-PCS | Mod: S$GLB,,, | Performed by: OBSTETRICS & GYNECOLOGY

## 2021-08-09 PROCEDURE — 88175 CYTOPATH C/V AUTO FLUID REDO: CPT | Performed by: OBSTETRICS & GYNECOLOGY

## 2021-08-09 PROCEDURE — 3008F BODY MASS INDEX DOCD: CPT | Mod: CPTII,S$GLB,, | Performed by: OBSTETRICS & GYNECOLOGY

## 2021-08-09 PROCEDURE — 3078F PR MOST RECENT DIASTOLIC BLOOD PRESSURE < 80 MM HG: ICD-10-PCS | Mod: CPTII,S$GLB,, | Performed by: OBSTETRICS & GYNECOLOGY

## 2021-08-09 PROCEDURE — 99396 PREV VISIT EST AGE 40-64: CPT | Mod: S$GLB,,, | Performed by: OBSTETRICS & GYNECOLOGY

## 2021-08-09 PROCEDURE — 1160F PR REVIEW ALL MEDS BY PRESCRIBER/CLIN PHARMACIST DOCUMENTED: ICD-10-PCS | Mod: CPTII,S$GLB,, | Performed by: OBSTETRICS & GYNECOLOGY

## 2021-08-09 PROCEDURE — 3008F PR BODY MASS INDEX (BMI) DOCUMENTED: ICD-10-PCS | Mod: CPTII,S$GLB,, | Performed by: OBSTETRICS & GYNECOLOGY

## 2021-08-09 PROCEDURE — 1160F RVW MEDS BY RX/DR IN RCRD: CPT | Mod: CPTII,S$GLB,, | Performed by: OBSTETRICS & GYNECOLOGY

## 2021-08-09 PROCEDURE — 1159F MED LIST DOCD IN RCRD: CPT | Mod: CPTII,S$GLB,, | Performed by: OBSTETRICS & GYNECOLOGY

## 2021-08-09 PROCEDURE — 87624 HPV HI-RISK TYP POOLED RSLT: CPT | Performed by: OBSTETRICS & GYNECOLOGY

## 2021-08-09 PROCEDURE — 1126F AMNT PAIN NOTED NONE PRSNT: CPT | Mod: CPTII,S$GLB,, | Performed by: OBSTETRICS & GYNECOLOGY

## 2021-08-09 PROCEDURE — 3074F PR MOST RECENT SYSTOLIC BLOOD PRESSURE < 130 MM HG: ICD-10-PCS | Mod: CPTII,S$GLB,, | Performed by: OBSTETRICS & GYNECOLOGY

## 2021-08-09 PROCEDURE — 1159F PR MEDICATION LIST DOCUMENTED IN MEDICAL RECORD: ICD-10-PCS | Mod: CPTII,S$GLB,, | Performed by: OBSTETRICS & GYNECOLOGY

## 2021-08-09 PROCEDURE — 3078F DIAST BP <80 MM HG: CPT | Mod: CPTII,S$GLB,, | Performed by: OBSTETRICS & GYNECOLOGY

## 2021-08-09 PROCEDURE — 3074F SYST BP LT 130 MM HG: CPT | Mod: CPTII,S$GLB,, | Performed by: OBSTETRICS & GYNECOLOGY

## 2021-08-09 RX ORDER — PREGABALIN 75 MG/1
CAPSULE ORAL
COMMUNITY

## 2021-08-09 RX ORDER — HYDROXYZINE HYDROCHLORIDE 25 MG/1
TABLET, FILM COATED ORAL
COMMUNITY

## 2021-08-09 RX ORDER — CETIRIZINE HYDROCHLORIDE 10 MG/1
10 TABLET ORAL DAILY
COMMUNITY
Start: 2021-07-21

## 2021-08-09 RX ORDER — MONTELUKAST SODIUM 10 MG/1
10 TABLET ORAL DAILY
COMMUNITY
Start: 2021-07-21

## 2021-08-13 LAB
FINAL PATHOLOGIC DIAGNOSIS: NORMAL
Lab: NORMAL

## 2021-08-17 ENCOUNTER — HOSPITAL ENCOUNTER (OUTPATIENT)
Dept: RADIOLOGY | Facility: OTHER | Age: 41
Discharge: HOME | End: 2021-08-17
Attending: OBSTETRICS & GYNECOLOGY
Payer: COMMERCIAL

## 2021-08-17 DIAGNOSIS — Z12.31 SCREENING MAMMOGRAM, ENCOUNTER FOR: ICD-10-CM

## 2021-08-17 LAB
HPV HR 12 DNA SPEC QL NAA+PROBE: NEGATIVE
HPV16 AG SPEC QL: NEGATIVE
HPV18 DNA SPEC QL NAA+PROBE: NEGATIVE

## 2021-08-17 PROCEDURE — 77067 SCR MAMMO BI INCL CAD: CPT | Mod: TC

## 2021-08-17 PROCEDURE — 77067 MAMMO DIGITAL SCREENING BILAT WITH TOMO: ICD-10-PCS | Mod: 26,,, | Performed by: RADIOLOGY

## 2021-08-17 PROCEDURE — 77063 BREAST TOMOSYNTHESIS BI: CPT | Mod: 26,,, | Performed by: RADIOLOGY

## 2021-08-17 PROCEDURE — 77067 SCR MAMMO BI INCL CAD: CPT | Mod: 26,,, | Performed by: RADIOLOGY

## 2021-08-17 PROCEDURE — 77063 MAMMO DIGITAL SCREENING BILAT WITH TOMO: ICD-10-PCS | Mod: 26,,, | Performed by: RADIOLOGY

## 2022-02-17 NOTE — LACTATION NOTE
This note was copied from a baby's chart.  Discussed the desired feeding choice with the patient.  Reviewed the benefits of breastfeeding and the risks of formula feeding. States understanding of all information and verbalized appropriate recall.    Reviewed the risks of supplementation.  Discussed the adequacy of colostrum.  Instructed on normal  feeding and sleeping patterns.  Encouraged mother to feed the infant on cue, a minimum of 8 times in 24 hours prior to supplementation to promote appropriate breast stimulation for adequate milk supply.  Discussed preferred alternative feeding methods, such as supplementing the infant via breast with SNS, syringe feeding, cup feeding, spoon feeding and finger feeding.  Discussed risks and encouraged to avoid artificial bottles and nipples.  Chooses to supplement via syringe.  Safely taught how to feed infant via chosen method.  Demonstrated by nurse and pt return demonstrates proper and safe usage.  States understand and provided appropriate recall of all information.    Formula Feeding Discharge Instructions    Baby is to be fed by the Baby Led bottle feeding method:   Feed on Cue:  o Hunger cues - hands to mouth, bending arms and legs toward the body, sucking noises, puckered lips and rooting/searching for the nipple   Method of feeding the baby:  o always hold the baby upright, never prop a bottle  o brush the nipple across babys upper lip and wait to open  o hold bottle in a flat position, only partly full  o allow baby to pause and take breaks; burp as needed  o feeding lasts about 15 - 20 minutes  o Stop feeding with signs of fullness  o Fullness cues - sucking slows or stops, relaxed hands and arms, pushes away, falls asleep  Preparing Powdered Formula:   Remove plastic lid and wash lid with soap and water, dry and label with date   Clean top of can & open.  Remove scoop.   Follow s instructions on quantity of water and powder   Follow  pediatricians recommendation on the type of water to use   Shake well prior to feeding   For pre-mixed formula - Refrigerate and use within 24 hours.  Re-warm individual bottles immediately prior to use.   Formula expires 1 hour after in initiation of the feeding  Preparing Liquid Concentrate Formula:   Follow pediatricians recommendation on the type of water to use   Add equal amounts of liquid concentrate and formula to the bottle   Shake well prior to feeding   For pre-mixed formula - Refrigerate and use within 24 hours.  Re-warm individual bottles immediately prior to use   For formula remaining in the can, cover and refrigerate until needed.  Use within 48 hours   Formula expires 1 hour after in initiation of the feeding    Preparing Ready to Feed Formula:   Shake container well prior to opening   Pour enough formula for 1 feeding into a clean bottle   Do not add water or any other liquid   Attach nipple and cap   Shake well prior to feeding   Feed immediately   For pre-mixed formula - Refrigerate and use within 24 hours.  Re-warm individual bottles immediately prior to use   For formula remaining in the can, cover and refrigerate until needed.  Use within 48 hours   Formula expires 1 hour after in initiation of the feeding  Cleaning and sterilization of equipment for formula preparation:   Clean and disinfect working surface   Wash hands, arms and under fingernails with soap and water; dry using a clean cloth   Use bottle/nipple brush to wash all bottles, nipples, rings, caps and preparation utensils in hot soapy water before initial use and rinse   Sterilize all parts/utensils in boiling water or with a sterilization device prior to use   Continue to wash all parts with warm soapy water and rinse after each use and sterilize daily  Appropriate storage of formula if more than 1 bottle is prepared:   Put a clean nipple right side up on the bottle and cover with a nipple cap   Label  each bottle with the date and time prepared   Refrigerate until feeding time   Warm immediately prior to use by a bottle warmer or by running under warm water   Do NOT microwave bottles   For formula remaining in the can, cover and refrigerate until needed.  Use within 48 hours   Formula expires 1 hour after in initiation of the feeding  Safe formula feeding, preparation and transporting of pre-mixed feedings:   Always use thoroughly cleaned and sterilized BPA free bottles   Formula & water preference to be determined by the advice of the pediatrician   Use proper hand washing   Follow all s guidelines for preparing formula   Check all expiration dates   Clean all can tops with soap and water prior to opening; also use a clean can opener   All mixed formula should be refrigerated until immediately prior to transport   Transport in a cool insulated bag with ice packs and use within 2 hours or re-refrigerate at arrival destination   Re-warm feeding at the destination for no longer than 15 minutes      Community Resources     Women, Infants, and Children Nutrition Program   Provides free breastfeeding education, counseling, food coupons, and breast pumps for eligible women. Breastfeeding counseling is provided by peer counselors and mother-to-mother support.      [phone]   HereOrThere.SafeTool.ScreenTag.gov    [facility] Connects moms, babies, and families in [place] to free help, pregnancy resources, and information about healthy behaviors pre- and . Available .  4-534-601-BABY   www.1323630jrph.org   info@6436574vofh.org    [facility] ([facility] Early [facility]hips Support & Services)   This program is for parents who have concerns about their baby's fussiness during the first year of life. Infant specialists work with you to find more ways to soothe, care for, and enjoy your baby.  [phone]   [email or web address]   [email or web address]    [facility]  [facility] Provides preconception, pregnancy, and post discharge support through nutrition services, primary medical care for children, and many other services. Available on the phone and one-to-one.  [phone]   www.dcsno.org    [facility] (Poison Control)   The American Association of Poison Control Centers supports the [place] [address] poison centers in their efforts to prevent and treat poison exposures. Poison centers offer free, confidential, expert medical advice 24 hours a day, seven days a week.  1-840.240.5468   www.aapcc.org/         No
